# Patient Record
Sex: MALE | Race: WHITE | NOT HISPANIC OR LATINO | Employment: UNEMPLOYED | URBAN - NONMETROPOLITAN AREA
[De-identification: names, ages, dates, MRNs, and addresses within clinical notes are randomized per-mention and may not be internally consistent; named-entity substitution may affect disease eponyms.]

---

## 2021-02-25 DIAGNOSIS — Z23 IMMUNIZATION DUE: ICD-10-CM

## 2021-03-08 ENCOUNTER — IMMUNIZATION (OUTPATIENT)
Dept: VACCINE CLINIC | Facility: HOSPITAL | Age: 66
End: 2021-03-08

## 2021-03-08 PROCEDURE — 91300 HC SARSCOV02 VAC 30MCG/0.3ML IM: CPT | Performed by: INTERNAL MEDICINE

## 2021-03-08 PROCEDURE — 0001A: CPT | Performed by: INTERNAL MEDICINE

## 2021-03-29 ENCOUNTER — IMMUNIZATION (OUTPATIENT)
Dept: VACCINE CLINIC | Facility: HOSPITAL | Age: 66
End: 2021-03-29

## 2021-03-29 PROCEDURE — 91300 HC SARSCOV02 VAC 30MCG/0.3ML IM: CPT | Performed by: INTERNAL MEDICINE

## 2021-03-29 PROCEDURE — 0002A: CPT | Performed by: INTERNAL MEDICINE

## 2021-04-13 ENCOUNTER — TRANSCRIBE ORDERS (OUTPATIENT)
Dept: ADMINISTRATIVE | Facility: HOSPITAL | Age: 66
End: 2021-04-13

## 2021-04-13 DIAGNOSIS — Z01.818 PRE-OPERATIVE CLEARANCE: Primary | ICD-10-CM

## 2021-04-16 ENCOUNTER — LAB (OUTPATIENT)
Dept: LAB | Facility: HOSPITAL | Age: 66
End: 2021-04-16

## 2021-04-16 DIAGNOSIS — Z01.818 PRE-OPERATIVE CLEARANCE: ICD-10-CM

## 2021-04-16 PROCEDURE — C9803 HOPD COVID-19 SPEC COLLECT: HCPCS

## 2021-04-16 PROCEDURE — U0004 COV-19 TEST NON-CDC HGH THRU: HCPCS

## 2021-04-17 LAB — SARS-COV-2 RNA NOSE QL NAA+PROBE: NOT DETECTED

## 2021-10-08 ENCOUNTER — LAB (OUTPATIENT)
Dept: LAB | Facility: HOSPITAL | Age: 66
End: 2021-10-08

## 2021-10-08 ENCOUNTER — TRANSCRIBE ORDERS (OUTPATIENT)
Dept: ADMINISTRATIVE | Facility: HOSPITAL | Age: 66
End: 2021-10-08

## 2021-10-08 DIAGNOSIS — R23.3 SPONTANEOUS ECCHYMOSES: ICD-10-CM

## 2021-10-08 DIAGNOSIS — R23.3 SPONTANEOUS ECCHYMOSES: Primary | ICD-10-CM

## 2021-10-08 LAB
CLOSURE TME COLL+ADP BLD: 70 SECONDS (ref 56–102)
CLOSURE TME COLL+EPINEP BLD: 77 SECONDS (ref 80–184)
PLATELET FUNCTION: ABNORMAL

## 2021-10-08 PROCEDURE — 36415 COLL VENOUS BLD VENIPUNCTURE: CPT

## 2021-10-08 PROCEDURE — 85576 BLOOD PLATELET AGGREGATION: CPT

## 2021-10-15 ENCOUNTER — IMMUNIZATION (OUTPATIENT)
Dept: VACCINE CLINIC | Facility: HOSPITAL | Age: 66
End: 2021-10-15

## 2021-10-15 PROCEDURE — 91300 HC SARSCOV02 VAC 30MCG/0.3ML IM: CPT | Performed by: INTERNAL MEDICINE

## 2021-10-15 PROCEDURE — 0004A ADM SARSCOV2 30MCG/0.3ML BOOSTER: CPT | Performed by: INTERNAL MEDICINE

## 2023-06-13 ENCOUNTER — APPOINTMENT (OUTPATIENT)
Dept: GENERAL RADIOLOGY | Facility: HOSPITAL | Age: 68
End: 2023-06-13
Payer: MEDICARE

## 2023-06-13 PROCEDURE — 99282 EMERGENCY DEPT VISIT SF MDM: CPT

## 2023-06-13 PROCEDURE — 74018 RADEX ABDOMEN 1 VIEW: CPT | Performed by: RADIOLOGY

## 2023-06-13 PROCEDURE — 74018 RADEX ABDOMEN 1 VIEW: CPT

## 2023-06-14 ENCOUNTER — HOSPITAL ENCOUNTER (EMERGENCY)
Facility: HOSPITAL | Age: 68
Discharge: HOME | End: 2023-06-14
Attending: STUDENT IN AN ORGANIZED HEALTH CARE EDUCATION/TRAINING PROGRAM | Admitting: STUDENT IN AN ORGANIZED HEALTH CARE EDUCATION/TRAINING PROGRAM
Payer: MEDICARE

## 2023-06-14 ENCOUNTER — APPOINTMENT (OUTPATIENT)
Dept: CT IMAGING | Facility: HOSPITAL | Age: 68
End: 2023-06-14
Payer: MEDICARE

## 2023-06-14 ENCOUNTER — APPOINTMENT (OUTPATIENT)
Dept: GENERAL RADIOLOGY | Facility: HOSPITAL | Age: 68
End: 2023-06-14
Payer: MEDICARE

## 2023-06-14 VITALS
WEIGHT: 260 LBS | OXYGEN SATURATION: 97 % | BODY MASS INDEX: 37.22 KG/M2 | RESPIRATION RATE: 16 BRPM | HEART RATE: 66 BPM | DIASTOLIC BLOOD PRESSURE: 92 MMHG | TEMPERATURE: 97.8 F | SYSTOLIC BLOOD PRESSURE: 190 MMHG | HEIGHT: 70 IN

## 2023-06-14 DIAGNOSIS — T18.5XXA FOREIGN BODY IN ANUS AND RECTUM, INITIAL ENCOUNTER: Primary | ICD-10-CM

## 2023-06-14 PROCEDURE — 72220 X-RAY EXAM SACRUM TAILBONE: CPT | Performed by: RADIOLOGY

## 2023-06-14 PROCEDURE — 74176 CT ABD & PELVIS W/O CONTRAST: CPT

## 2023-06-14 PROCEDURE — 72220 X-RAY EXAM SACRUM TAILBONE: CPT

## 2023-06-14 PROCEDURE — 74176 CT ABD & PELVIS W/O CONTRAST: CPT | Performed by: RADIOLOGY

## 2023-06-14 RX ORDER — POLYETHYLENE GLYCOL 3350 17 G/17G
17 POWDER, FOR SOLUTION ORAL 2 TIMES DAILY
Qty: 238 G | Refills: 0 | Status: SHIPPED | OUTPATIENT
Start: 2023-06-14 | End: 2023-06-21

## 2023-06-14 NOTE — ED NOTES
MEDICAL SCREENING:    Reason for Visit: FB in rectum    Patient initially seen in triage.  The patient was advised further evaluation and diagnostic testing will be needed, some of the treatment and testing will be initiated in the lobby in order to begin the process.  The patient will be returned to the waiting area for the time being and possibly be re-assessed by a subsequent ED provider.  The patient will be brought back to the treatment area in as timely manner as possible.       Carson Azul II, PA  06/13/23 [id number]

## 2023-06-14 NOTE — ED PROVIDER NOTES
Subjective   History of Present Illness  67-year-old male presents to the ER with foreign body in the rectum.  Patient verbalized that he was using a plastic spoon with the diameter of that about a quarter.  And inserted again into his rectum.  Patient verbalized that a portion of the handle did break off into his rectum.  Patient states this piece was probably around 3 to 4 inches.  States that he tried to have a bowel movement at home to push it out however was unsuccessful.  Patient is not complaining of any pain at this time.      Review of Systems   Constitutional: Negative.  Negative for fever.   HENT: Negative.     Respiratory: Negative.     Cardiovascular: Negative.  Negative for chest pain.   Gastrointestinal: Negative.  Negative for abdominal pain.   Endocrine: Negative.    Genitourinary: Negative.  Negative for dysuria.   Skin: Negative.    Neurological: Negative.    Psychiatric/Behavioral: Negative.     All other systems reviewed and are negative.    No past medical history on file.    No Known Allergies    No past surgical history on file.    No family history on file.    Social History     Socioeconomic History    Marital status: Single           Objective   Physical Exam  Vitals and nursing note reviewed.   Constitutional:       General: He is not in acute distress.     Appearance: He is well-developed. He is not diaphoretic.   HENT:      Head: Normocephalic and atraumatic.      Right Ear: External ear normal.      Left Ear: External ear normal.      Nose: Nose normal.   Eyes:      Conjunctiva/sclera: Conjunctivae normal.   Neck:      Vascular: No JVD.      Trachea: No tracheal deviation.   Cardiovascular:      Rate and Rhythm: Normal rate.      Heart sounds: No murmur heard.  Pulmonary:      Effort: Pulmonary effort is normal. No respiratory distress.      Breath sounds: No wheezing.   Abdominal:      Palpations: Abdomen is soft.      Tenderness: There is no abdominal tenderness.   Genitourinary:      Comments: Digital rectal exam did not appreciate any palpation of foreign body within the rectum.  Musculoskeletal:         General: No deformity. Normal range of motion.      Cervical back: Normal range of motion and neck supple.   Skin:     General: Skin is warm and dry.      Coloration: Skin is not pale.      Findings: No erythema or rash.   Neurological:      Mental Status: He is alert and oriented to person, place, and time.      Cranial Nerves: No cranial nerve deficit.   Psychiatric:         Behavior: Behavior normal.         Thought Content: Thought content normal.       Procedures           ED Course  ED Course as of 06/14/23 0633   Wed Jun 14, 2023   0546 Contacted general surgery Dr. Freedman who states that given the size should pass.  Open to outpatient f/u if needed.   [RB]   0632 CT abd pelvis rad interpreted:  NEGATIVE FOR RADIOPAQUE FOREIGN BODY. [RB]   0633 XR sacrum and coccyx rad interpreted:  NO RADIOPAQUE FOREIGN BODY IDENTIFIED. [RB]   0633 XR KUB rad inerpreted: No radiopaque foreign body. Nonobstructive bowel gas pattern. [RB]      ED Course User Index  [RB] Carson Azul II, PA                                           Medical Decision Making  67-year-old male with chief complaint of placing something in his anus.    Problems Addressed:  Foreign body in anus and rectum, initial encounter: acute illness or injury     Details: Imaging does not show any type of foreign body.  Discussion with general surgery states that this should pass.  Outpatient surgical follow-up.  Return to the ED should symptoms worsen.    Amount and/or Complexity of Data Reviewed  Radiology: ordered. Decision-making details documented in ED Course.        Final diagnoses:   Foreign body in anus and rectum, initial encounter       ED Disposition  ED Disposition       ED Disposition   Discharge    Condition   Stable    Comment   --               Steve Freedman MD  [address] [place] [address]  [phone]  [phone]    Schedule an appointment as soon as possible for a visit            Medication List        New Prescriptions      polyethylene glycol 17 GM/SCOOP powder  Commonly known as: MIRALAX  Take 17 g by mouth 2 (Two) Times a Day for 7 days.               Where to Get Your Medications        These medications were sent to [facility] [place], [place] - [address] [place] [address] [phone] [facility] [phone] [address] [address]      Phone: [phone]   polyethylene glycol 17 GM/SCOOP powder            Carson Azul II, PA  06/14/23 0605

## 2023-06-14 NOTE — DISCHARGE INSTRUCTIONS
Continue with MiraLAX daily until bowels are continuously moving.  Monitor bowel movements for passage of foreign body.  Outpatient surgical follow-up with Dr. Freedman

## 2023-09-22 ENCOUNTER — TRANSCRIBE ORDERS (OUTPATIENT)
Dept: ADMINISTRATIVE | Facility: HOSPITAL | Age: 68
End: 2023-09-22
Payer: COMMERCIAL

## 2023-09-22 DIAGNOSIS — N28.1 ACQUIRED CYST OF KIDNEY: Primary | ICD-10-CM

## 2023-11-06 ENCOUNTER — HOSPITAL ENCOUNTER (OUTPATIENT)
Dept: ULTRASOUND IMAGING | Facility: HOSPITAL | Age: 68
Discharge: HOME | End: 2023-11-06
Admitting: HOSPITALIST
Payer: MEDICARE

## 2023-11-06 DIAGNOSIS — N28.1 ACQUIRED CYST OF KIDNEY: ICD-10-CM

## 2023-11-06 PROCEDURE — 76775 US EXAM ABDO BACK WALL LIM: CPT | Performed by: RADIOLOGY

## 2023-11-06 PROCEDURE — 76775 US EXAM ABDO BACK WALL LIM: CPT

## 2023-11-07 ENCOUNTER — APPOINTMENT (OUTPATIENT)
Dept: CT IMAGING | Facility: HOSPITAL | Age: 68
DRG: 683 | End: 2023-11-07
Payer: MEDICARE

## 2023-11-07 ENCOUNTER — HOSPITAL ENCOUNTER (INPATIENT)
Facility: HOSPITAL | Age: 68
LOS: 1 days | Discharge: HOME | DRG: 683 | End: 2023-11-10
Attending: STUDENT IN AN ORGANIZED HEALTH CARE EDUCATION/TRAINING PROGRAM | Admitting: INTERNAL MEDICINE
Payer: MEDICARE

## 2023-11-07 DIAGNOSIS — E87.5 HYPERKALEMIA: ICD-10-CM

## 2023-11-07 DIAGNOSIS — N17.9 AKI (ACUTE KIDNEY INJURY): Primary | ICD-10-CM

## 2023-11-07 LAB
ALBUMIN SERPL-MCNC: 4.2 G/DL (ref 3.5–5.2)
ALBUMIN/GLOB SERPL: 1.5 G/DL
ALP SERPL-CCNC: 87 U/L (ref 39–117)
ALT SERPL W P-5'-P-CCNC: 11 U/L (ref 1–41)
ANION GAP SERPL CALCULATED.3IONS-SCNC: 8.9 MMOL/L (ref 5–15)
ANION GAP SERPL CALCULATED.3IONS-SCNC: 9.6 MMOL/L (ref 5–15)
AST SERPL-CCNC: 15 U/L (ref 1–40)
BASOPHILS # BLD AUTO: 0.06 10*3/MM3 (ref 0–0.2)
BASOPHILS NFR BLD AUTO: 0.7 % (ref 0–1.5)
BILIRUB SERPL-MCNC: 0.4 MG/DL (ref 0–1.2)
BILIRUB UR QL STRIP: NEGATIVE
BUN SERPL-MCNC: 57 MG/DL (ref 8–23)
BUN SERPL-MCNC: 61 MG/DL (ref 8–23)
BUN/CREAT SERPL: 20.1 (ref 7–25)
BUN/CREAT SERPL: 21.2 (ref 7–25)
CALCIUM SPEC-SCNC: 8.5 MG/DL (ref 8.6–10.5)
CALCIUM SPEC-SCNC: 9.2 MG/DL (ref 8.6–10.5)
CHLORIDE SERPL-SCNC: 110 MMOL/L (ref 98–107)
CHLORIDE SERPL-SCNC: 115 MMOL/L (ref 98–107)
CK SERPL-CCNC: 83 U/L (ref 20–200)
CLARITY UR: CLEAR
CO2 SERPL-SCNC: 16.1 MMOL/L (ref 22–29)
CO2 SERPL-SCNC: 20.4 MMOL/L (ref 22–29)
COLOR UR: YELLOW
CREAT SERPL-MCNC: 2.69 MG/DL (ref 0.76–1.27)
CREAT SERPL-MCNC: 3.03 MG/DL (ref 0.76–1.27)
DEPRECATED RDW RBC AUTO: 47 FL (ref 37–54)
EGFRCR SERPLBLD CKD-EPI 2021: 21.9 ML/MIN/1.73
EGFRCR SERPLBLD CKD-EPI 2021: 25.3 ML/MIN/1.73
EOSINOPHIL # BLD AUTO: 0.27 10*3/MM3 (ref 0–0.4)
EOSINOPHIL NFR BLD AUTO: 3.2 % (ref 0.3–6.2)
ERYTHROCYTE [DISTWIDTH] IN BLOOD BY AUTOMATED COUNT: 12.6 % (ref 12.3–15.4)
GLOBULIN UR ELPH-MCNC: 2.8 GM/DL
GLUCOSE BLDC GLUCOMTR-MCNC: 127 MG/DL (ref 70–130)
GLUCOSE BLDC GLUCOMTR-MCNC: 137 MG/DL (ref 70–130)
GLUCOSE SERPL-MCNC: 82 MG/DL (ref 65–99)
GLUCOSE SERPL-MCNC: 86 MG/DL (ref 65–99)
GLUCOSE UR STRIP-MCNC: ABNORMAL MG/DL
HBA1C MFR BLD: 7 % (ref 4.8–5.6)
HCT VFR BLD AUTO: 39 % (ref 37.5–51)
HGB BLD-MCNC: 11.8 G/DL (ref 13–17.7)
HGB UR QL STRIP.AUTO: NEGATIVE
HOLD SPECIMEN: NORMAL
HOLD SPECIMEN: NORMAL
IMM GRANULOCYTES # BLD AUTO: 0.04 10*3/MM3 (ref 0–0.05)
IMM GRANULOCYTES NFR BLD AUTO: 0.5 % (ref 0–0.5)
KETONES UR QL STRIP: NEGATIVE
LEUKOCYTE ESTERASE UR QL STRIP.AUTO: NEGATIVE
LYMPHOCYTES # BLD AUTO: 2.29 10*3/MM3 (ref 0.7–3.1)
LYMPHOCYTES NFR BLD AUTO: 27.3 % (ref 19.6–45.3)
MAGNESIUM SERPL-MCNC: 1.3 MG/DL (ref 1.6–2.4)
MCH RBC QN AUTO: 30.6 PG (ref 26.6–33)
MCHC RBC AUTO-ENTMCNC: 30.3 G/DL (ref 31.5–35.7)
MCV RBC AUTO: 101 FL (ref 79–97)
MONOCYTES # BLD AUTO: 0.59 10*3/MM3 (ref 0.1–0.9)
MONOCYTES NFR BLD AUTO: 7 % (ref 5–12)
NEUTROPHILS NFR BLD AUTO: 5.15 10*3/MM3 (ref 1.7–7)
NEUTROPHILS NFR BLD AUTO: 61.3 % (ref 42.7–76)
NITRITE UR QL STRIP: NEGATIVE
NRBC BLD AUTO-RTO: 0 /100 WBC (ref 0–0.2)
PH UR STRIP.AUTO: <=5 [PH] (ref 5–8)
PLATELET # BLD AUTO: 244 10*3/MM3 (ref 140–450)
PMV BLD AUTO: 9.9 FL (ref 6–12)
POTASSIUM SERPL-SCNC: 5.8 MMOL/L (ref 3.5–5.2)
POTASSIUM SERPL-SCNC: 5.9 MMOL/L (ref 3.5–5.2)
PROT SERPL-MCNC: 7 G/DL (ref 6–8.5)
PROT UR QL STRIP: NEGATIVE
RBC # BLD AUTO: 3.86 10*6/MM3 (ref 4.14–5.8)
SODIUM SERPL-SCNC: 140 MMOL/L (ref 136–145)
SODIUM SERPL-SCNC: 140 MMOL/L (ref 136–145)
SP GR UR STRIP: 1.01 (ref 1–1.03)
UROBILINOGEN UR QL STRIP: ABNORMAL
WBC NRBC COR # BLD: 8.4 10*3/MM3 (ref 3.4–10.8)
WHOLE BLOOD HOLD COAG: NORMAL
WHOLE BLOOD HOLD SPECIMEN: NORMAL

## 2023-11-07 PROCEDURE — 63710000001 INSULIN GLARGINE PER 5 UNITS: Performed by: INTERNAL MEDICINE

## 2023-11-07 PROCEDURE — 36415 COLL VENOUS BLD VENIPUNCTURE: CPT

## 2023-11-07 PROCEDURE — 99223 1ST HOSP IP/OBS HIGH 75: CPT

## 2023-11-07 PROCEDURE — 93010 ELECTROCARDIOGRAM REPORT: CPT | Performed by: INTERNAL MEDICINE

## 2023-11-07 PROCEDURE — 25810000003 SODIUM CHLORIDE 0.9 % SOLUTION: Performed by: PHYSICIAN ASSISTANT

## 2023-11-07 PROCEDURE — 80053 COMPREHEN METABOLIC PANEL: CPT | Performed by: PHYSICIAN ASSISTANT

## 2023-11-07 PROCEDURE — 83735 ASSAY OF MAGNESIUM: CPT | Performed by: PHYSICIAN ASSISTANT

## 2023-11-07 PROCEDURE — G0378 HOSPITAL OBSERVATION PER HR: HCPCS

## 2023-11-07 PROCEDURE — 93005 ELECTROCARDIOGRAM TRACING: CPT | Performed by: PHYSICIAN ASSISTANT

## 2023-11-07 PROCEDURE — 25010000002 MAGNESIUM SULFATE 2 GM/50ML SOLUTION: Performed by: PHYSICIAN ASSISTANT

## 2023-11-07 PROCEDURE — 25010000002 HEPARIN (PORCINE) PER 1000 UNITS: Performed by: INTERNAL MEDICINE

## 2023-11-07 PROCEDURE — 82550 ASSAY OF CK (CPK): CPT | Performed by: PHYSICIAN ASSISTANT

## 2023-11-07 PROCEDURE — 74176 CT ABD & PELVIS W/O CONTRAST: CPT

## 2023-11-07 PROCEDURE — 74176 CT ABD & PELVIS W/O CONTRAST: CPT | Performed by: RADIOLOGY

## 2023-11-07 PROCEDURE — 83036 HEMOGLOBIN GLYCOSYLATED A1C: CPT

## 2023-11-07 PROCEDURE — 99285 EMERGENCY DEPT VISIT HI MDM: CPT

## 2023-11-07 PROCEDURE — 82948 REAGENT STRIP/BLOOD GLUCOSE: CPT

## 2023-11-07 PROCEDURE — 81003 URINALYSIS AUTO W/O SCOPE: CPT | Performed by: PHYSICIAN ASSISTANT

## 2023-11-07 PROCEDURE — 85025 COMPLETE CBC W/AUTO DIFF WBC: CPT | Performed by: PHYSICIAN ASSISTANT

## 2023-11-07 RX ORDER — LISINOPRIL 20 MG/1
20 TABLET ORAL 2 TIMES DAILY
COMMUNITY
End: 2023-11-10 | Stop reason: HOSPADM

## 2023-11-07 RX ORDER — DAPAGLIFLOZIN 10 MG/1
10 TABLET, FILM COATED ORAL DAILY
COMMUNITY
End: 2023-11-10 | Stop reason: HOSPADM

## 2023-11-07 RX ORDER — GLUCAGON 1 MG/ML
1 KIT INJECTION
Status: DISCONTINUED | OUTPATIENT
Start: 2023-11-07 | End: 2023-11-10 | Stop reason: HOSPADM

## 2023-11-07 RX ORDER — SODIUM CHLORIDE 0.9 % (FLUSH) 0.9 %
10 SYRINGE (ML) INJECTION AS NEEDED
Status: DISCONTINUED | OUTPATIENT
Start: 2023-11-07 | End: 2023-11-10 | Stop reason: HOSPADM

## 2023-11-07 RX ORDER — ATORVASTATIN CALCIUM 10 MG/1
10 TABLET, FILM COATED ORAL DAILY
Status: DISCONTINUED | OUTPATIENT
Start: 2023-11-07 | End: 2023-11-10 | Stop reason: HOSPADM

## 2023-11-07 RX ORDER — BISACODYL 10 MG
10 SUPPOSITORY, RECTAL RECTAL DAILY PRN
Status: DISCONTINUED | OUTPATIENT
Start: 2023-11-07 | End: 2023-11-10 | Stop reason: HOSPADM

## 2023-11-07 RX ORDER — TAMSULOSIN HYDROCHLORIDE 0.4 MG/1
1 CAPSULE ORAL DAILY
COMMUNITY

## 2023-11-07 RX ORDER — GLIPIZIDE 5 MG/1
20 TABLET ORAL
Status: CANCELLED | OUTPATIENT
Start: 2023-11-08

## 2023-11-07 RX ORDER — INSULIN DEGLUDEC INJECTION 100 U/ML
10 INJECTION, SOLUTION SUBCUTANEOUS NIGHTLY
COMMUNITY

## 2023-11-07 RX ORDER — LACTULOSE 10 G/15ML
30 SOLUTION ORAL ONCE
Status: COMPLETED | OUTPATIENT
Start: 2023-11-07 | End: 2023-11-07

## 2023-11-07 RX ORDER — INSULIN LISPRO 100 [IU]/ML
2-7 INJECTION, SOLUTION INTRAVENOUS; SUBCUTANEOUS
Status: DISCONTINUED | OUTPATIENT
Start: 2023-11-07 | End: 2023-11-10 | Stop reason: HOSPADM

## 2023-11-07 RX ORDER — NIFEDIPINE 30 MG/1
30 TABLET, FILM COATED, EXTENDED RELEASE ORAL ONCE
Status: COMPLETED | OUTPATIENT
Start: 2023-11-07 | End: 2023-11-07

## 2023-11-07 RX ORDER — SODIUM CHLORIDE 9 MG/ML
40 INJECTION, SOLUTION INTRAVENOUS AS NEEDED
Status: DISCONTINUED | OUTPATIENT
Start: 2023-11-07 | End: 2023-11-10 | Stop reason: HOSPADM

## 2023-11-07 RX ORDER — POLYETHYLENE GLYCOL 3350 17 G/17G
17 POWDER, FOR SOLUTION ORAL DAILY PRN
Status: DISCONTINUED | OUTPATIENT
Start: 2023-11-07 | End: 2023-11-10 | Stop reason: HOSPADM

## 2023-11-07 RX ORDER — TAMSULOSIN HYDROCHLORIDE 0.4 MG/1
0.4 CAPSULE ORAL DAILY
Status: DISCONTINUED | OUTPATIENT
Start: 2023-11-07 | End: 2023-11-10 | Stop reason: HOSPADM

## 2023-11-07 RX ORDER — PANTOPRAZOLE SODIUM 40 MG/1
40 TABLET, DELAYED RELEASE ORAL
Status: DISCONTINUED | OUTPATIENT
Start: 2023-11-08 | End: 2023-11-10 | Stop reason: HOSPADM

## 2023-11-07 RX ORDER — CLONIDINE 0.3 MG/24H
1 PATCH, EXTENDED RELEASE TRANSDERMAL WEEKLY
COMMUNITY

## 2023-11-07 RX ORDER — GLIMEPIRIDE 4 MG/1
8 TABLET ORAL
COMMUNITY

## 2023-11-07 RX ORDER — CALCITRIOL 0.25 UG/1
0.25 CAPSULE, LIQUID FILLED ORAL 3 TIMES WEEKLY
Status: DISCONTINUED | OUTPATIENT
Start: 2023-11-08 | End: 2023-11-10 | Stop reason: HOSPADM

## 2023-11-07 RX ORDER — LISINOPRIL 10 MG/1
20 TABLET ORAL 2 TIMES DAILY
Status: CANCELLED | OUTPATIENT
Start: 2023-11-07

## 2023-11-07 RX ORDER — NICOTINE POLACRILEX 4 MG
15 LOZENGE BUCCAL
Status: DISCONTINUED | OUTPATIENT
Start: 2023-11-07 | End: 2023-11-10 | Stop reason: HOSPADM

## 2023-11-07 RX ORDER — DEXTROSE MONOHYDRATE 25 G/50ML
25 INJECTION, SOLUTION INTRAVENOUS
Status: DISCONTINUED | OUTPATIENT
Start: 2023-11-07 | End: 2023-11-10 | Stop reason: HOSPADM

## 2023-11-07 RX ORDER — CLONIDINE 0.3 MG/24H
1 PATCH, EXTENDED RELEASE TRANSDERMAL WEEKLY
Status: DISCONTINUED | OUTPATIENT
Start: 2023-11-12 | End: 2023-11-10 | Stop reason: HOSPADM

## 2023-11-07 RX ORDER — NIFEDIPINE 30 MG/1
30 TABLET, FILM COATED, EXTENDED RELEASE ORAL
Status: DISCONTINUED | OUTPATIENT
Start: 2023-11-07 | End: 2023-11-07

## 2023-11-07 RX ORDER — OMEPRAZOLE 20 MG/1
20 CAPSULE, DELAYED RELEASE ORAL DAILY
COMMUNITY

## 2023-11-07 RX ORDER — MAGNESIUM SULFATE HEPTAHYDRATE 40 MG/ML
2 INJECTION, SOLUTION INTRAVENOUS ONCE
Status: COMPLETED | OUTPATIENT
Start: 2023-11-07 | End: 2023-11-07

## 2023-11-07 RX ORDER — AMOXICILLIN 250 MG
2 CAPSULE ORAL 2 TIMES DAILY
Status: DISCONTINUED | OUTPATIENT
Start: 2023-11-07 | End: 2023-11-10 | Stop reason: HOSPADM

## 2023-11-07 RX ORDER — ATORVASTATIN CALCIUM 10 MG/1
10 TABLET, FILM COATED ORAL DAILY
COMMUNITY

## 2023-11-07 RX ORDER — CALCITRIOL 0.25 UG/1
0.25 CAPSULE, LIQUID FILLED ORAL 3 TIMES WEEKLY
COMMUNITY

## 2023-11-07 RX ORDER — GABAPENTIN 400 MG/1
400 CAPSULE ORAL 3 TIMES DAILY PRN
Status: CANCELLED | OUTPATIENT
Start: 2023-11-07

## 2023-11-07 RX ORDER — HYDRALAZINE HYDROCHLORIDE 25 MG/1
25 TABLET, FILM COATED ORAL ONCE
Status: COMPLETED | OUTPATIENT
Start: 2023-11-07 | End: 2023-11-07

## 2023-11-07 RX ORDER — GABAPENTIN 400 MG/1
400 CAPSULE ORAL 3 TIMES DAILY PRN
COMMUNITY
End: 2023-11-10 | Stop reason: HOSPADM

## 2023-11-07 RX ORDER — SODIUM CHLORIDE 0.9 % (FLUSH) 0.9 %
10 SYRINGE (ML) INJECTION EVERY 12 HOURS SCHEDULED
Status: DISCONTINUED | OUTPATIENT
Start: 2023-11-07 | End: 2023-11-10 | Stop reason: HOSPADM

## 2023-11-07 RX ORDER — BISACODYL 5 MG/1
5 TABLET, DELAYED RELEASE ORAL DAILY PRN
Status: DISCONTINUED | OUTPATIENT
Start: 2023-11-07 | End: 2023-11-10 | Stop reason: HOSPADM

## 2023-11-07 RX ORDER — HEPARIN SODIUM 5000 [USP'U]/ML
5000 INJECTION, SOLUTION INTRAVENOUS; SUBCUTANEOUS EVERY 8 HOURS SCHEDULED
Status: DISCONTINUED | OUTPATIENT
Start: 2023-11-07 | End: 2023-11-10 | Stop reason: HOSPADM

## 2023-11-07 RX ADMIN — SODIUM ZIRCONIUM CYCLOSILICATE 10 G: 10 POWDER, FOR SUSPENSION ORAL at 08:06

## 2023-11-07 RX ADMIN — SODIUM CHLORIDE 1000 ML: 9 INJECTION, SOLUTION INTRAVENOUS at 09:22

## 2023-11-07 RX ADMIN — Medication 10 ML: at 20:48

## 2023-11-07 RX ADMIN — SODIUM BICARBONATE 75 MEQ: 84 INJECTION, SOLUTION INTRAVENOUS at 13:44

## 2023-11-07 RX ADMIN — INSULIN GLARGINE 10 UNITS: 100 INJECTION, SOLUTION SUBCUTANEOUS at 20:48

## 2023-11-07 RX ADMIN — NIFEDIPINE 30 MG: 30 TABLET, EXTENDED RELEASE ORAL at 09:50

## 2023-11-07 RX ADMIN — HEPARIN SODIUM 5000 UNITS: 5000 INJECTION INTRAVENOUS; SUBCUTANEOUS at 15:32

## 2023-11-07 RX ADMIN — NIFEDIPINE 30 MG: 30 TABLET, EXTENDED RELEASE ORAL at 11:43

## 2023-11-07 RX ADMIN — SODIUM BICARBONATE 75 MEQ: 84 INJECTION, SOLUTION INTRAVENOUS at 21:50

## 2023-11-07 RX ADMIN — SODIUM CHLORIDE 1000 ML: 9 INJECTION, SOLUTION INTRAVENOUS at 09:10

## 2023-11-07 RX ADMIN — ATORVASTATIN CALCIUM 10 MG: 10 TABLET, FILM COATED ORAL at 18:10

## 2023-11-07 RX ADMIN — TAMSULOSIN HYDROCHLORIDE 0.4 MG: 0.4 CAPSULE ORAL at 18:10

## 2023-11-07 RX ADMIN — HEPARIN SODIUM 5000 UNITS: 5000 INJECTION INTRAVENOUS; SUBCUTANEOUS at 21:06

## 2023-11-07 RX ADMIN — LACTULOSE 30 G: 20 SOLUTION ORAL at 13:36

## 2023-11-07 RX ADMIN — SODIUM ZIRCONIUM CYCLOSILICATE 10 G: 10 POWDER, FOR SUSPENSION ORAL at 13:36

## 2023-11-07 RX ADMIN — MAGNESIUM SULFATE HEPTAHYDRATE 2 G: 40 INJECTION, SOLUTION INTRAVENOUS at 08:06

## 2023-11-07 RX ADMIN — HYDRALAZINE HYDROCHLORIDE 25 MG: 25 TABLET, FILM COATED ORAL at 09:49

## 2023-11-07 NOTE — H&P
[facility] Medicine Services  History & Physical    Patient Identification:  Name:  Moe Smith  Age:  68 y.o.  Sex:  male  :  1955  MRN:  [id number]   Visit Number:  [id number]  Admit Date: 2023   Primary Care Physician:  Mendoza Sharma MD    Subjective     Chief complaint: Abnormal lab    History of presenting illness:      Moe Smith is a 68 y.o. male with past medical history significant for CKD stage IIIa, insulin-dependent type 2 diabetes mellitus, essential hypertension, hyperlipidemia, and obesity by BMI.  Patient presents to [facility] emergency department after being directed to do so due to elevated potassium level. Patient states that he follows with Nephrology in the outpatient setting and had labs drawn per order by Dr. Muller on 2023. He states that he received notification of critical lab via telephone this morning around 05:30 a.m. Patient states that he has had issues with hyperkalemia in the past. Previously on losartan due to ACE-I intolerance however his potassium became elevated therefore he was placed back on Lisinopril. States that he has been taking Lisinopril BID; last dose was yesterday evening. He denies any lower extremity edema, palpitations, chest pain, shortness of breath, or any other acute complaints at this time. States that he has been eating and drinking normally; blood glucose levels have been running low and he admits that he has been eating peanut butter and crackers frequently to increase his glucose levels. He also states that he bought a gallon of orange juice yesterday. He has only been taking 10 units of his night time insulin instead of previously prescribed 20 units long acting insulin. Plan to hold Lisinopril on admission. Patient was placed on balanced bicarb gtt. Nephrology consulted for assistance as patient's renal function has worsened as well and he meets criteria for JANNETH. Creatinine on arrival 3.0;  baseline appears to be ~1.2-1.5. Discussed plans with patient. He voiced agreement and understanding with no further questions or concerns at this time.     Upon arrival to the ED, vital signs were temperature 97, pulse 67, respirations 12, blood pressure 187/93, SPO2 saturation 100% on room air.  CMP with potassium 5.8, chloride 110, CO2 20.4, BUN 61, creatinine 3.03, EGFR 21.9, magnesium 1.3, otherwise unremarkable.  CBC with RBCs 3.86, hemoglobin 11.8, .0, MCHC 30.3, otherwise unremarkable.  Urinalysis with 2+ glucose, otherwise unremarkable.  CT of the abdomen and pelvis without contrast noted degenerative changes in the lumbar spine, otherwise no acute abnormality.  Renal ultrasound noted 3.7 cm simple cyst of the right kidney.  Otherwise bilateral renal ultrasound negative.    Known Emergency Department medications received prior to my evaluation included 25 mg oral hydralazine, 30 g lactulose, 2 g IV magnesium sulfate, 30 mg nifedipine, 1 L normal saline bolus x2, 10 g packet of Lokelma x2, sodium bicarb infusion. Room location at the time of my evaluation was 304B.  Discussed with admitting physician, Toy Krause MD.    ---------------------------------------------------------------------------------------------------------------------   Review of Systems   Constitutional:  Negative for chills, fatigue and fever.   HENT:  Negative for congestion, sore throat and trouble swallowing.    Respiratory:  Negative for cough, shortness of breath and wheezing.    Cardiovascular:  Negative for chest pain, palpitations and leg swelling.   Gastrointestinal:  Negative for abdominal pain, constipation, diarrhea, nausea and vomiting.   Genitourinary:  Negative for difficulty urinating, flank pain, frequency and urgency.   Musculoskeletal:  Negative for back pain, gait problem, neck pain and neck stiffness.   Neurological:  Negative for dizziness, tremors, seizures, syncope, facial asymmetry, speech  difficulty, weakness, light-headedness, numbness and headaches.     ---------------------------------------------------------------------------------------------------------------------   No past medical history on file.  No past surgical history on file.  No family history on file.  Social History     Socioeconomic History    Marital status: Single     ---------------------------------------------------------------------------------------------------------------------   Allergies:  Sulfa antibiotics  ---------------------------------------------------------------------------------------------------------------------   Home medications:    Medications below are reported home medications pulling from within the system; at this time, these medications have not been reconciled unless otherwise specified and are in the verification process for further verifcation as current home medications.  (Not in a hospital admission)      Hospital Scheduled Meds:     sodium bicarbonate 8.4 % 75 mEq in sodium chloride 0.45 % 1,000 mL infusion (greater than 75 mEq), 75 mEq, Last Rate: 75 mEq (11/07/23 7524)        Current listed hospital scheduled medications may not yet reflect those currently placed in orders that are signed and held awaiting patient's arrival to floor.   ---------------------------------------------------------------------------------------------------------------------     Objective     Vital Signs:  Temp:  [97 °F (36.1 °C)] 97 °F (36.1 °C)  Heart Rate:  [61-85] 85  Resp:  [12] 12  BP: (147-192)/() 162/77      11/07/23  0642   Weight: 124 kg (273 lb)     Body mass index is 38.08 kg/m².  ---------------------------------------------------------------------------------------------------------------------       Physical Exam  Vitals reviewed.   Constitutional:       General: He is awake. He is not in acute distress.     Appearance: He is obese. He is not ill-appearing or diaphoretic.      Comments: Room air.     HENT:      Head: Normocephalic and atraumatic.      Mouth/Throat:      Mouth: Mucous membranes are moist.      Pharynx: Oropharynx is clear.   Eyes:      Extraocular Movements: Extraocular movements intact.      Pupils: Pupils are equal, round, and reactive to light.   Cardiovascular:      Rate and Rhythm: Normal rate and regular rhythm.      Pulses: Normal pulses.           Dorsalis pedis pulses are 2+ on the right side and 2+ on the left side.      Heart sounds: Normal heart sounds. No murmur heard.     No friction rub.   Pulmonary:      Effort: Pulmonary effort is normal. No accessory muscle usage, respiratory distress or retractions.      Breath sounds: No wheezing, rhonchi or rales.   Abdominal:      General: Bowel sounds are normal. There is no distension.      Palpations: Abdomen is soft.      Tenderness: There is no abdominal tenderness. There is no guarding.   Musculoskeletal:      Cervical back: Neck supple. No rigidity.      Right lower leg: No edema.      Left lower leg: No edema.   Skin:     General: Skin is warm and dry.      Capillary Refill: Capillary refill takes 2 to 3 seconds.   Neurological:      Mental Status: He is alert and oriented to person, place, and time. Mental status is at baseline.      Cranial Nerves: No dysarthria or facial asymmetry.      Sensory: Sensation is intact.      Motor: No weakness or tremor.   Psychiatric:         Attention and Perception: Attention normal.         Mood and Affect: Mood normal.         Speech: Speech normal.         Behavior: Behavior normal. Behavior is cooperative.         Thought Content: Thought content normal.         Cognition and Memory: Cognition normal.         Judgment: Judgment normal.       ---------------------------------------------------------------------------------------------------------------------  EKG:  Pending formal cardiology interpretation. Per my review, appears normal sinus rhythm 70s without evidence of acute ischemia.  "Mildly peaked T waves.      Telemetry:  Appearing normal sinus rhythm 70s during my exam.   I have personally looked at both the EKG and the telemetry strips.  ---------------------------------------------------------------------------------------------------------------------   Results from last 7 days   Lab Units 11/07/23  0701   WBC 10*3/mm3 8.40   HEMOGLOBIN g/dL 11.8*   HEMATOCRIT % 39.0   MCV fL 101.0*   MCHC g/dL 30.3*   PLATELETS 10*3/mm3 244         Results from last 7 days   Lab Units 11/07/23  1203 11/07/23  0701   SODIUM mmol/L 140 140   POTASSIUM mmol/L 5.9* 5.8*   MAGNESIUM mg/dL  --  1.3*   CHLORIDE mmol/L 115* 110*   CO2 mmol/L 16.1* 20.4*   BUN mg/dL 57* 61*   CREATININE mg/dL 2.69* 3.03*   CALCIUM mg/dL 8.5* 9.2   GLUCOSE mg/dL 86 82   ALBUMIN g/dL  --  4.2   BILIRUBIN mg/dL  --  0.4   ALK PHOS U/L  --  87   AST (SGOT) U/L  --  15   ALT (SGPT) U/L  --  11   Estimated Creatinine Clearance: 36.2 mL/min (A) (by C-G formula based on SCr of 2.69 mg/dL (H)).  No results found for: \"AMMONIA\"  Results from last 7 days   Lab Units 11/07/23  0701   CK TOTAL U/L 83         Lab Results   Component Value Date    HGBA1C 6.7 (H) 07/11/2015     No results found for: \"TSH\", \"FREET4\"  No results found for: \"PREGTESTUR\", \"PREGSERUM\", \"HCG\", \"HCGQUANT\"  Pain Management Panel  More data exists         Latest Ref Rng & Units 9/3/2016 8/30/2015   Pain Management Panel   Creatinine, Urine mg/dL 135.8  104.2          ---------------------------------------------------------------------------------------------------------------------  Imaging Results (Last 7 Days)       Procedure Component Value Units Date/Time    CT Abdomen Pelvis Without Contrast [[id number]] Collected: 11/07/23 0820     Updated: 11/07/23 0822    Narrative:      EXAM:    CT Abdomen and Pelvis Without Intravenous Contrast     EXAM DATE:    11/7/2023 8:51 AM     CLINICAL HISTORY:    JANNETH     TECHNIQUE:    Axial computed tomography images of the abdomen and " pelvis without  intravenous contrast.  Sagittal and coronal reformatted images were  created and reviewed.  This CT exam was performed using one or more of  the following dose reduction techniques:  automated exposure control,  adjustment of the mA and/or kV according to patient size, and/or use of  iterative reconstruction technique.     COMPARISON:    6/14/2023     FINDINGS:    LUNG BASES:  Unremarkable as visualized.  No mass.  No consolidation.      ABDOMEN:    LIVER:  Unremarkable as visualized.    GALLBLADDER AND BILE DUCTS:  Unremarkable as visualized.  No calcified  stones.  No ductal dilation.    PANCREAS:  Unremarkable as visualized.  No ductal dilation.    SPLEEN:  Unremarkable as visualized.  No splenomegaly.    ADRENALS:  Unremarkable as visualized.  No mass.    KIDNEYS AND URETERS:  Unremarkable as visualized.  No obstructing  stones.  No hydronephrosis.    STOMACH AND BOWEL:  Scattered diverticula in the colon.  No  diverticulitis.  No obstruction.      PELVIS:    APPENDIX:  No findings to suggest acute appendicitis.    BLADDER:  Unremarkable as visualized.  No stones.    REPRODUCTIVE:  Unremarkable as visualized.      ABDOMEN and PELVIS:    INTRAPERITONEAL SPACE:  Unremarkable as visualized.  No free air.  No  significant fluid collection.    BONES/JOINTS:  Degenerative disc disease throughout the lumbar spine.   Degenerative facet arthropathy throughout the lumbar spine, most  prominent in the lower lumbar spine.  No acute fracture.  No  dislocation.    SOFT TISSUES:  Unremarkable as visualized.    VASCULATURE:  Atherosclerotic disease.  No abdominal aortic aneurysm.    LYMPH NODES:  Unremarkable as visualized.  No enlarged lymph nodes.       Impression:        Degenerative changes lumbar spine as described.        This report was finalized on 11/7/2023 8:20 AM by Dr. Raul Buchanan MD.               Last echocardiogram: No previous echo on file.      I have personally reviewed the above radiology  images and read the final radiology report on 11/07/23  ---------------------------------------------------------------------------------------------------------------------  Assessment / Plan     Active Hospital Problems    Diagnosis  POA    **JANNETH (acute kidney injury) [N17.9]  Yes       ASSESSMENT/PLAN:  -JANNETH on CKD stage IIIa, present on admission  -Anion gap metabolic acidosis due to above  -Acute hypomagnesemia and acute hyperkalemia, present on admission  -Chronic macrocytic anemia, stable  -Hypertension, uncontrolled  -Insulin-dependent type II DM  -Obesity by BMI, 39.89 kg/m2  -Baseline creatinine ~1.2-1.5 with creatinine on admission of 3.0.  -CT of the abdomen and pelvis without contrast obtained without evidence of urinary obstruction or hydronephrosis.  -Bilateral renal ultrasound noted 3.7 cm cyst of the right kidney, otherwise unremarkable.  -Monitor urine output and renal function closely.  -Avoid nephrotoxins as able.  -Avoid hypotension.  -Patient hypertensive on arrival to ED; BP improved after administration of oral hydralazine and Nifedipine.   -Continue to closely monitor VS per hospital policy.  -Review home antihypertensive agents once reconciled by pharmacy; plan to continue with holding parameters to prevent hypotension and/or bradycardia. Hold any medication which may have contributed to JANNETH.   -Placed on balanced bicarb gtt on admission @ 150 ml/hr.  -Received Lokelma x2 and magnesium replacement so far.   -Continue to monitor electrolytes and replace as necessary.   -Continuous cardiac monitoring ordered.  -Repeat chemistry panel in AM.  -Inpatient Nephrology consult placed for further assistance. Greatly appreciate their input.   -Obtain HgbA1C.  -Review home medication regimen once reconciled per pharmacy.   -Hold any home oral DM medications for now.  -Start low dose SSI, titrate dosage as necessary.  -Closely monitor blood glucose levels with accuchecks AC and HS.  -Hypoglycemia  protocol in place.       ----------  -DVT prophylaxis: SubQ Heparin.  -Activity: As tolerated.   -Expected length of stay:   OBSERVATION status; however, if further evaluation or treatment plans warrant, status may change.  Based upon current information, I predict patient's care encounter to be less than or equal to 2 midnights   -Disposition plans to return home on discharge once medically stable.    High risk secondary to JANNETH on CKD stage IIIa, anion gap metabolic acidosis, acute hypomagnesemia, acute hyperkalemia.       CODE STATUS: FULL CODE      Jelly Dejesus, APRN 11/07/23  13:51 EST  Pager #[phone]  ---------------------------------------------------------------------------------------------------------------------

## 2023-11-07 NOTE — PLAN OF CARE
Goal Outcome Evaluation:  Plan of Care Reviewed With: patient        Progress: improving          Patient admitted from ER this shift. Patient A&Ox4 denies any skin issues and refuses a full skin assessment with ELVIA [place] present. Patient has no complaints at this time. No s/s of acute distress noted. Will continue to follow plan of care.

## 2023-11-07 NOTE — ED PROVIDER NOTES
Subjective   History of Present Illness  68-year-old male presents to the ED with chief complaint of abnormal lab.  Patient verbalized at outpatient lab he had a metabolic panel ran yesterday.  Patient was notified that his potassium was 6.9.  Patient does have stage III kidney disease in which she is under the care of nephrologist Dr. Montero.  Patient states the only medication that he has started over the last few weeks has been Flomax.  Patient did stop his angiotensin receptor blocker.        Review of Systems   Constitutional: Negative.  Negative for fever.   HENT: Negative.     Respiratory: Negative.     Cardiovascular: Negative.  Negative for chest pain.   Gastrointestinal: Negative.  Negative for abdominal pain.   Endocrine: Negative.    Genitourinary: Negative.  Negative for dysuria.   Skin: Negative.    Neurological: Negative.    Psychiatric/Behavioral: Negative.     All other systems reviewed and are negative.      No past medical history on file.    Allergies   Allergen Reactions    Sulfa Antibiotics Hives       No past surgical history on file.    No family history on file.    Social History     Socioeconomic History    Marital status: Single           Objective   Physical Exam  Vitals and nursing note reviewed.   Constitutional:       General: He is not in acute distress.     Appearance: He is well-developed. He is not diaphoretic.   HENT:      Head: Normocephalic and atraumatic.      Right Ear: External ear normal.      Left Ear: External ear normal.      Nose: Nose normal.   Eyes:      Conjunctiva/sclera: Conjunctivae normal.   Neck:      Vascular: No JVD.      Trachea: No tracheal deviation.   Cardiovascular:      Rate and Rhythm: Normal rate and regular rhythm.      Heart sounds: Normal heart sounds. No murmur heard.  Pulmonary:      Effort: Pulmonary effort is normal. No respiratory distress.      Breath sounds: Normal breath sounds. No wheezing.   Abdominal:      Palpations: Abdomen is soft.       Tenderness: There is no abdominal tenderness.   Musculoskeletal:         General: No deformity. Normal range of motion.      Cervical back: Normal range of motion and neck supple.   Skin:     General: Skin is warm and dry.      Coloration: Skin is not pale.      Findings: No erythema or rash.   Neurological:      Mental Status: He is alert and oriented to person, place, and time.      Cranial Nerves: No cranial nerve deficit.   Psychiatric:         Behavior: Behavior normal.         Thought Content: Thought content normal.         Procedures       Results for orders placed or performed during the hospital encounter of 11/07/23   Comprehensive Metabolic Panel    Specimen: Arm, Right; Blood   Result Value Ref Range    Glucose 82 65 - 99 mg/dL    BUN 61 (H) 8 - 23 mg/dL    Creatinine 3.03 (H) 0.76 - 1.27 mg/dL    Sodium 140 136 - 145 mmol/L    Potassium 5.8 (H) 3.5 - 5.2 mmol/L    Chloride 110 (H) 98 - 107 mmol/L    CO2 20.4 (L) 22.0 - 29.0 mmol/L    Calcium 9.2 8.6 - 10.5 mg/dL    Total Protein 7.0 6.0 - 8.5 g/dL    Albumin 4.2 3.5 - 5.2 g/dL    ALT (SGPT) 11 1 - 41 U/L    AST (SGOT) 15 1 - 40 U/L    Alkaline Phosphatase 87 39 - 117 U/L    Total Bilirubin 0.4 0.0 - 1.2 mg/dL    Globulin 2.8 gm/dL    A/G Ratio 1.5 g/dL    BUN/Creatinine Ratio 20.1 7.0 - 25.0    Anion Gap 9.6 5.0 - 15.0 mmol/L    eGFR 21.9 (L) >60.0 mL/min/1.73   Urinalysis With Microscopic If Indicated (No Culture) - Urine, Clean Catch    Specimen: Urine, Clean Catch   Result Value Ref Range    Color, UA Yellow Yellow, Straw    Appearance, UA Clear Clear    pH, UA <=5.0 5.0 - 8.0    Specific Gravity, UA 1.015 1.005 - 1.030    Glucose,  mg/dL (2+) (A) Negative    Ketones, UA Negative Negative    Bilirubin, UA Negative Negative    Blood, UA Negative Negative    Protein, UA Negative Negative    Leuk Esterase, UA Negative Negative    Nitrite, UA Negative Negative    Urobilinogen, UA 0.2 E.U./dL 0.2 - 1.0 E.U./dL   Magnesium    Specimen: Arm, Right;  Blood   Result Value Ref Range    Magnesium 1.3 (L) 1.6 - 2.4 mg/dL   CBC Auto Differential    Specimen: Arm, Right; Blood   Result Value Ref Range    WBC 8.40 3.40 - 10.80 10*3/mm3    RBC 3.86 (L) 4.14 - 5.80 10*6/mm3    Hemoglobin 11.8 (L) 13.0 - 17.7 g/dL    Hematocrit 39.0 37.5 - 51.0 %    .0 (H) 79.0 - 97.0 fL    MCH 30.6 26.6 - 33.0 pg    MCHC 30.3 (L) 31.5 - 35.7 g/dL    RDW 12.6 12.3 - 15.4 %    RDW-SD 47.0 37.0 - 54.0 fl    MPV 9.9 6.0 - 12.0 fL    Platelets 244 140 - 450 10*3/mm3    Neutrophil % 61.3 42.7 - 76.0 %    Lymphocyte % 27.3 19.6 - 45.3 %    Monocyte % 7.0 5.0 - 12.0 %    Eosinophil % 3.2 0.3 - 6.2 %    Basophil % 0.7 0.0 - 1.5 %    Immature Grans % 0.5 0.0 - 0.5 %    Neutrophils, Absolute 5.15 1.70 - 7.00 10*3/mm3    Lymphocytes, Absolute 2.29 0.70 - 3.10 10*3/mm3    Monocytes, Absolute 0.59 0.10 - 0.90 10*3/mm3    Eosinophils, Absolute 0.27 0.00 - 0.40 10*3/mm3    Basophils, Absolute 0.06 0.00 - 0.20 10*3/mm3    Immature Grans, Absolute 0.04 0.00 - 0.05 10*3/mm3    nRBC 0.0 0.0 - 0.2 /100 WBC   CK    Specimen: Arm, Right; Blood   Result Value Ref Range    Creatine Kinase 83 20 - 200 U/L   Basic Metabolic Panel    Specimen: Hand, Right; Blood   Result Value Ref Range    Glucose 86 65 - 99 mg/dL    BUN 57 (H) 8 - 23 mg/dL    Creatinine 2.69 (H) 0.76 - 1.27 mg/dL    Sodium 140 136 - 145 mmol/L    Potassium 5.9 (H) 3.5 - 5.2 mmol/L    Chloride 115 (H) 98 - 107 mmol/L    CO2 16.1 (L) 22.0 - 29.0 mmol/L    Calcium 8.5 (L) 8.6 - 10.5 mg/dL    BUN/Creatinine Ratio 21.2 7.0 - 25.0    Anion Gap 8.9 5.0 - 15.0 mmol/L    eGFR 25.3 (L) >60.0 mL/min/1.73   Green Top (Gel)   Result Value Ref Range    Extra Tube Hold for add-ons.    Lavender Top   Result Value Ref Range    Extra Tube hold for add-on    Gold Top - SST   Result Value Ref Range    Extra Tube Hold for add-ons.    Light Blue Top   Result Value Ref Range    Extra Tube Hold for add-ons.           ED Course  ED Course as of 11/07/23 1319   Tue  Nov 07, 2023   0726 ECG 12 Lead Electrolyte Imbalance  NSR< rate 71, , no acute ST or T wave changes []   0741 Discussed with nephrology Dr. Montero who is the patient's nephrologist.  Creatinine of 3 is high for this patient.  Given the fact that the patient does also have findings of hyperkalemia states that the patient may have to come in for JANNETH. [RB]   0830 CT Abdomen Pelvis Without Contrast  FINDINGS:    LUNG BASES:  Unremarkable as visualized.  No mass.  No consolidation.      ABDOMEN:    LIVER:  Unremarkable as visualized.    GALLBLADDER AND BILE DUCTS:  Unremarkable as visualized.  No calcified  stones.  No ductal dilation.    PANCREAS:  Unremarkable as visualized.  No ductal dilation.    SPLEEN:  Unremarkable as visualized.  No splenomegaly.    ADRENALS:  Unremarkable as visualized.  No mass.    KIDNEYS AND URETERS:  Unremarkable as visualized.  No obstructing  stones.  No hydronephrosis.    STOMACH AND BOWEL:  Scattered diverticula in the colon.  No  diverticulitis.  No obstruction.      PELVIS:    APPENDIX:  No findings to suggest acute appendicitis.    BLADDER:  Unremarkable as visualized.  No stones.    REPRODUCTIVE:  Unremarkable as visualized.      ABDOMEN and PELVIS:    INTRAPERITONEAL SPACE:  Unremarkable as visualized.  No free air.  No  significant fluid collection.    BONES/JOINTS:  Degenerative disc disease throughout the lumbar spine.   Degenerative facet arthropathy throughout the lumbar spine, most  prominent in the lower lumbar spine.  No acute fracture.  No  dislocation.    SOFT TISSUES:  Unremarkable as visualized.    VASCULATURE:  Atherosclerotic disease.  No abdominal aortic aneurysm.    LYMPH NODES:  Unremarkable as visualized.  No enlarged lymph nodes.     IMPRESSION:    Degenerative changes lumbar spine as described.   [AH]   5997 Contacted nephrology office for most recent labs - on 9/18/23 patient had BUN of 41 and creatinine of 1.82 []   1315 Dr. Catalan to admit [AH]       ED Course User Index  [AH] Lety Pastrana PA  [CW] Nicolas Manjarrez DO  [RB] Carson Azul II, PA                                           Medical Decision Making  68-year-old male who presents to the ED today for hyperkalemia.  He states he received a phone call that his potassium was 6.9 on a recent lab check.  He does have a history of stage III chronic kidney disease.  On his last check his creatinine was 1.82.  This was in September.  His initial creatinine today was 3 with a potassium of 5.8.  He was given Lokelma as well as 2 L of normal saline.  Recheck BMP showed that his creatinine went down to 2.6 however his potassium remained at 5.9.  Therefore decision was made for the need for admission.  Patient is noted to be on lisinopril 20 mg for blood pressure control.  This is most likely a contributing factor to his worsening renal disease.  This was held while he was in the ED and he was given nifedipine and hydralazine for his hypertension.  I spoke with Dr. Catalan with our hospitalist service team and the patient will be admitted.    Problems Addressed:  JANNETH (acute kidney injury): complicated acute illness or injury  Hyperkalemia: complicated acute illness or injury    Amount and/or Complexity of Data Reviewed  Labs: ordered.  Radiology: ordered. Decision-making details documented in ED Course.  ECG/medicine tests: ordered. Decision-making details documented in ED Course.    Risk  Prescription drug management.  Decision regarding hospitalization.        Final diagnoses:   JANNETH (acute kidney injury)   Hyperkalemia       ED Disposition  ED Disposition       ED Disposition   Decision to Admit    Condition   --    Comment   Level of Care: Telemetry [5]   Diagnosis: JANNETH (acute kidney injury) [126382]   Admitting Physician: MAXIMINO CATALAN [[id number]]   Attending Physician: MAXIMINO CATALAN [[id number]]                 No follow-up provider specified.       Medication List      No changes were made to your  prescriptions during this visit.            Lety Pastrana, PA  11/07/23 4520

## 2023-11-07 NOTE — ED NOTES
Assessing patient at this time. Patient states that he was called by his PCP stating that his potassium was high and to come to the ER. Patient reports no complaints. Patient denies chest pain and SOA. Patient remains on cardiac monitor and vital sign monitoring. Left AC IV flushed and saline locked. Patient provided with urinal and explained that a urine sample is needed and to notify ED staff when able to provide one.

## 2023-11-08 LAB
ANION GAP SERPL CALCULATED.3IONS-SCNC: 9.8 MMOL/L (ref 5–15)
BUN SERPL-MCNC: 52 MG/DL (ref 8–23)
BUN/CREAT SERPL: 20.5 (ref 7–25)
CALCIUM SPEC-SCNC: 8.4 MG/DL (ref 8.6–10.5)
CHLORIDE SERPL-SCNC: 113 MMOL/L (ref 98–107)
CO2 SERPL-SCNC: 20.2 MMOL/L (ref 22–29)
CREAT SERPL-MCNC: 2.54 MG/DL (ref 0.76–1.27)
DEPRECATED RDW RBC AUTO: 45.2 FL (ref 37–54)
EGFRCR SERPLBLD CKD-EPI 2021: 27.1 ML/MIN/1.73
ERYTHROCYTE [DISTWIDTH] IN BLOOD BY AUTOMATED COUNT: 12.5 % (ref 12.3–15.4)
GLUCOSE BLDC GLUCOMTR-MCNC: 104 MG/DL (ref 70–130)
GLUCOSE BLDC GLUCOMTR-MCNC: 136 MG/DL (ref 70–130)
GLUCOSE BLDC GLUCOMTR-MCNC: 155 MG/DL (ref 70–130)
GLUCOSE BLDC GLUCOMTR-MCNC: 76 MG/DL (ref 70–130)
GLUCOSE BLDC GLUCOMTR-MCNC: 98 MG/DL (ref 70–130)
GLUCOSE SERPL-MCNC: 90 MG/DL (ref 65–99)
HCT VFR BLD AUTO: 32.1 % (ref 37.5–51)
HGB BLD-MCNC: 10.1 G/DL (ref 13–17.7)
MCH RBC QN AUTO: 31 PG (ref 26.6–33)
MCHC RBC AUTO-ENTMCNC: 31.5 G/DL (ref 31.5–35.7)
MCV RBC AUTO: 98.5 FL (ref 79–97)
PLATELET # BLD AUTO: 198 10*3/MM3 (ref 140–450)
PMV BLD AUTO: 9.9 FL (ref 6–12)
POTASSIUM SERPL-SCNC: 5 MMOL/L (ref 3.5–5.2)
QT INTERVAL: 404 MS
QTC INTERVAL: 439 MS
RBC # BLD AUTO: 3.26 10*6/MM3 (ref 4.14–5.8)
SODIUM SERPL-SCNC: 143 MMOL/L (ref 136–145)
WBC NRBC COR # BLD: 7.14 10*3/MM3 (ref 3.4–10.8)

## 2023-11-08 PROCEDURE — 63710000001 INSULIN GLARGINE PER 5 UNITS: Performed by: INTERNAL MEDICINE

## 2023-11-08 PROCEDURE — G0378 HOSPITAL OBSERVATION PER HR: HCPCS

## 2023-11-08 PROCEDURE — 99231 SBSQ HOSP IP/OBS SF/LOW 25: CPT

## 2023-11-08 PROCEDURE — 80048 BASIC METABOLIC PNL TOTAL CA: CPT | Performed by: INTERNAL MEDICINE

## 2023-11-08 PROCEDURE — 25010000002 HEPARIN (PORCINE) PER 1000 UNITS: Performed by: INTERNAL MEDICINE

## 2023-11-08 PROCEDURE — 82948 REAGENT STRIP/BLOOD GLUCOSE: CPT

## 2023-11-08 PROCEDURE — 63710000001 INSULIN LISPRO (HUMAN) PER 5 UNITS

## 2023-11-08 PROCEDURE — 25010000002 ONDANSETRON PER 1 MG

## 2023-11-08 PROCEDURE — 85027 COMPLETE CBC AUTOMATED: CPT | Performed by: INTERNAL MEDICINE

## 2023-11-08 RX ORDER — ACETAMINOPHEN 325 MG/1
650 TABLET ORAL EVERY 6 HOURS PRN
Status: DISCONTINUED | OUTPATIENT
Start: 2023-11-08 | End: 2023-11-10 | Stop reason: HOSPADM

## 2023-11-08 RX ORDER — HYDRALAZINE HYDROCHLORIDE 50 MG/1
50 TABLET, FILM COATED ORAL 3 TIMES DAILY
Status: DISCONTINUED | OUTPATIENT
Start: 2023-11-08 | End: 2023-11-10

## 2023-11-08 RX ORDER — ONDANSETRON 2 MG/ML
4 INJECTION INTRAMUSCULAR; INTRAVENOUS EVERY 6 HOURS PRN
Status: DISCONTINUED | OUTPATIENT
Start: 2023-11-08 | End: 2023-11-10 | Stop reason: HOSPADM

## 2023-11-08 RX ADMIN — HYDRALAZINE HYDROCHLORIDE 50 MG: 50 TABLET, FILM COATED ORAL at 14:31

## 2023-11-08 RX ADMIN — HEPARIN SODIUM 5000 UNITS: 5000 INJECTION INTRAVENOUS; SUBCUTANEOUS at 21:12

## 2023-11-08 RX ADMIN — SODIUM BICARBONATE 75 MEQ: 84 INJECTION, SOLUTION INTRAVENOUS at 21:45

## 2023-11-08 RX ADMIN — SODIUM BICARBONATE 75 MEQ: 84 INJECTION, SOLUTION INTRAVENOUS at 12:28

## 2023-11-08 RX ADMIN — Medication 10 ML: at 20:09

## 2023-11-08 RX ADMIN — HEPARIN SODIUM 5000 UNITS: 5000 INJECTION INTRAVENOUS; SUBCUTANEOUS at 05:11

## 2023-11-08 RX ADMIN — ACETAMINOPHEN 650 MG: 325 TABLET ORAL at 02:44

## 2023-11-08 RX ADMIN — INSULIN LISPRO 2 UNITS: 100 INJECTION, SOLUTION INTRAVENOUS; SUBCUTANEOUS at 20:14

## 2023-11-08 RX ADMIN — Medication 10 ML: at 08:43

## 2023-11-08 RX ADMIN — HYDRALAZINE HYDROCHLORIDE 50 MG: 50 TABLET, FILM COATED ORAL at 20:08

## 2023-11-08 RX ADMIN — ONDANSETRON 4 MG: 2 INJECTION INTRAMUSCULAR; INTRAVENOUS at 12:28

## 2023-11-08 RX ADMIN — PANTOPRAZOLE SODIUM 40 MG: 40 TABLET, DELAYED RELEASE ORAL at 05:11

## 2023-11-08 RX ADMIN — HYDRALAZINE HYDROCHLORIDE 50 MG: 50 TABLET, FILM COATED ORAL at 08:46

## 2023-11-08 RX ADMIN — CALCITRIOL 0.25 MCG: 0.25 CAPSULE ORAL at 08:43

## 2023-11-08 RX ADMIN — INSULIN GLARGINE 10 UNITS: 100 INJECTION, SOLUTION SUBCUTANEOUS at 20:09

## 2023-11-08 RX ADMIN — TAMSULOSIN HYDROCHLORIDE 0.4 MG: 0.4 CAPSULE ORAL at 08:43

## 2023-11-08 RX ADMIN — HEPARIN SODIUM 5000 UNITS: 5000 INJECTION INTRAVENOUS; SUBCUTANEOUS at 13:07

## 2023-11-08 RX ADMIN — SODIUM BICARBONATE 75 MEQ: 84 INJECTION, SOLUTION INTRAVENOUS at 05:38

## 2023-11-08 RX ADMIN — ATORVASTATIN CALCIUM 10 MG: 10 TABLET, FILM COATED ORAL at 08:43

## 2023-11-08 NOTE — PLAN OF CARE
Goal Outcome Evaluation:   Patient has been pleasant. Compliant with care and medications as ordered. Patient is currently resting in bed. No S&S of distress noted at this time. IV with fluids infusing as ordered. Will continue plan of care.

## 2023-11-08 NOTE — PLAN OF CARE
Goal Outcome Evaluation:      Patient has been resting in bed this shift. No signs or symptoms of acute distress noted. Patient complained of a headache, relieved with newly ordered PRN Tylenol. Will continue with plan of care.

## 2023-11-08 NOTE — PROGRESS NOTES
Patient Identification:  Name:  Moe Smith  Age:  68 y.o.  Sex:  male  :  1955  MRN:  [id number]  Visit Number:  [id number]  Primary Care Provider:  Mendoza Sharma MD    Length of stay:  0    Subjective/Interval History/Consultants/Procedures     Chief Complaint:   Chief Complaint   Patient presents with    Abnormal Lab       Subjective/Interval History:    68 y.o. male who was admitted on 2023 with JANNETH, hyperkalemia     PMH is significant for  CKD stage IIIa, insulin-dependent type 2 diabetes mellitus, essential hypertension, hyperlipidemia, and obesity by BMI   For complete admission information, please see history and physical.     Consultations:  nephrology     Procedures/Scans:  CT abdomen pelvis without contrast    Today, the patient  was resting comfortably. He reported he didn't sleep well though at the time of exam stated he felt well overall. Later in the day complaining of some nausea and did add PRN zofran. Denied any palpitations or chest pain.    Room location at the time of evaluation was 304b.    ----------------------------------------------------------------------------------------------------------------------  Current [facility] Meds:  atorvastatin, 10 mg, Oral, Daily  calcitriol, 0.25 mcg, Oral, Once per day on   [START ON 2023] cloNIDine, 1 patch, Transdermal, Weekly  heparin (porcine), 5,000 Units, Subcutaneous, Q8H  hydrALAZINE, 50 mg, Oral, TID  insulin glargine, 10 Units, Subcutaneous, Nightly  insulin lispro, 2-7 Units, Subcutaneous, 4x Daily AC & at Bedtime  pantoprazole, 40 mg, Oral, Q AM  senna-docusate sodium, 2 tablet, Oral, BID  sodium chloride, 10 mL, Intravenous, Q12H  tamsulosin, 0.4 mg, Oral, Daily      sodium bicarbonate 8.4 % 75 mEq in sodium chloride 0.45 % 1,000 mL infusion (greater than 75 mEq), 75 mEq, Last Rate: 150 mL/hr at 23  0749      ----------------------------------------------------------------------------------------------------------------------      Objective     Vital Signs:  Temp:  [97.7 °F (36.5 °C)-98.4 °F (36.9 °C)] 98.4 °F (36.9 °C)  Heart Rate:  [61-85] 65  Resp:  [16-20] 20  BP: (109-192)/() 158/76      11/07/23  0642 11/07/23  1429 11/08/23  0545   Weight: 124 kg (273 lb) 126 kg (278 lb) 129 kg (283 lb 8 oz)     Body mass index is 40.68 kg/m².    Intake/Output Summary (Last 24 hours) at 11/8/2023 0804  Last data filed at 11/8/2023 0426  Gross per 24 hour   Intake 4170 ml   Output 1000 ml   Net 3170 ml     No intake/output data recorded.  Diet: Diabetic Diets, Renal Diets; Consistent Carbohydrate; Low Potassium; Texture: Regular Texture (IDDSI 7); Fluid Consistency: Thin (IDDSI 0)  ----------------------------------------------------------------------------------------------------------------------    Physical Exam  Vitals and nursing note reviewed.   Constitutional:       General: He is not in acute distress.     Appearance: He is obese.   HENT:      Head: Normocephalic and atraumatic.   Eyes:      Extraocular Movements: Extraocular movements intact.      Conjunctiva/sclera: Conjunctivae normal.   Cardiovascular:      Rate and Rhythm: Normal rate.   Musculoskeletal:      Right lower leg: No edema.      Left lower leg: No edema.   Skin:     General: Skin is warm.   Neurological:      Mental Status: He is alert. Mental status is at baseline.   Psychiatric:         Mood and Affect: Mood normal.         Behavior: Behavior normal.                ----------------------------------------------------------------------------------------------------------------------  Tele:      ----------------------------------------------------------------------------------------------------------------------  Results from last 7 days   Lab Units 11/07/23  0701   CK TOTAL U/L 83     Results from last 7 days   Lab Units 11/08/23  0119  "11/07/23 0701   WBC 10*3/mm3 7.14 8.40   HEMOGLOBIN g/dL 10.1* 11.8*   HEMATOCRIT % 32.1* 39.0   MCV fL 98.5* 101.0*   MCHC g/dL 31.5 30.3*   PLATELETS 10*3/mm3 198 244         Results from last 7 days   Lab Units 11/08/23  0119 11/07/23  1203 11/07/23  0701   SODIUM mmol/L 143 140 140   POTASSIUM mmol/L 5.0 5.9* 5.8*   MAGNESIUM mg/dL  --   --  1.3*   CHLORIDE mmol/L 113* 115* 110*   CO2 mmol/L 20.2* 16.1* 20.4*   BUN mg/dL 52* 57* 61*   CREATININE mg/dL 2.54* 2.69* 3.03*   CALCIUM mg/dL 8.4* 8.5* 9.2   GLUCOSE mg/dL 90 86 82   ALBUMIN g/dL  --   --  4.2   BILIRUBIN mg/dL  --   --  0.4   ALK PHOS U/L  --   --  87   AST (SGOT) U/L  --   --  15   ALT (SGPT) U/L  --   --  11   Estimated Creatinine Clearance: 38.6 mL/min (A) (by C-G formula based on SCr of 2.54 mg/dL (H)).  No results found for: \"AMMONIA\"      No results found for: \"BLOODCX\"  No results found for: \"URINECX\"  No results found for: \"WOUNDCX\"  No results found for: \"STOOLCX\"  ----------------------------------------------------------------------------------------------------------------------  Imaging Results (Last 24 Hours)       Procedure Component Value Units Date/Time    CT Abdomen Pelvis Without Contrast [[id number]] Collected: 11/07/23 0820     Updated: 11/07/23 0822    Narrative:      EXAM:    CT Abdomen and Pelvis Without Intravenous Contrast     EXAM DATE:    11/7/2023 8:51 AM     CLINICAL HISTORY:    JANNETH     TECHNIQUE:    Axial computed tomography images of the abdomen and pelvis without  intravenous contrast.  Sagittal and coronal reformatted images were  created and reviewed.  This CT exam was performed using one or more of  the following dose reduction techniques:  automated exposure control,  adjustment of the mA and/or kV according to patient size, and/or use of  iterative reconstruction technique.     COMPARISON:    6/14/2023     FINDINGS:    LUNG BASES:  Unremarkable as visualized.  No mass.  No consolidation.      ABDOMEN:    LIVER:  " Unremarkable as visualized.    GALLBLADDER AND BILE DUCTS:  Unremarkable as visualized.  No calcified  stones.  No ductal dilation.    PANCREAS:  Unremarkable as visualized.  No ductal dilation.    SPLEEN:  Unremarkable as visualized.  No splenomegaly.    ADRENALS:  Unremarkable as visualized.  No mass.    KIDNEYS AND URETERS:  Unremarkable as visualized.  No obstructing  stones.  No hydronephrosis.    STOMACH AND BOWEL:  Scattered diverticula in the colon.  No  diverticulitis.  No obstruction.      PELVIS:    APPENDIX:  No findings to suggest acute appendicitis.    BLADDER:  Unremarkable as visualized.  No stones.    REPRODUCTIVE:  Unremarkable as visualized.      ABDOMEN and PELVIS:    INTRAPERITONEAL SPACE:  Unremarkable as visualized.  No free air.  No  significant fluid collection.    BONES/JOINTS:  Degenerative disc disease throughout the lumbar spine.   Degenerative facet arthropathy throughout the lumbar spine, most  prominent in the lower lumbar spine.  No acute fracture.  No  dislocation.    SOFT TISSUES:  Unremarkable as visualized.    VASCULATURE:  Atherosclerotic disease.  No abdominal aortic aneurysm.    LYMPH NODES:  Unremarkable as visualized.  No enlarged lymph nodes.       Impression:        Degenerative changes lumbar spine as described.        This report was finalized on 11/7/2023 8:20 AM by Dr. Raul Buchanan MD.             ----------------------------------------------------------------------------------------------------------------------   I have reviewed the above laboratory values for 11/08/23    Assessment/Plan     Active Hospital Problems    Diagnosis  POA    **JANNETH (acute kidney injury) [N17.9]  Yes         ASSESSMENT/PLAN:    JANNETH on CKD stage IIIa  Metabolic acidosis, 2/2 above  Hyperkalemia, resolved  Hypomagnesemia  Patient presented at the direction of nephrology due to laboratory abnormalities.  Creatinine 3.03 on admission with baseline 1.2-1.5.  Now improved to 2.54.  Continuing  fluid replacement with sodium bicarb in half normal saline.  Avoid nephrotoxins as able  Repeat BMP and magnesium in the a.m.  Continue to correct electrolyte abnormalities as indicated  Nephrology consulted, assistance appreciated.  Follow-up recommendations.  Recent bilateral renal ultrasound only noted simple cyst right kidney.  Monitor per telemetry.    Chronic:  IDDM 2  Hypertension  Hyperlipidemia  Home med regimen continued as indicated  A1c is 7.  Continue 10 units Lantus nightly as well as low-dose sliding scale.  Monitor and adjust as needed.  -----------  -DVT prophylaxis: Subcu heparin  -Disposition plans/anticipated needs: Pending course, likely return home 24-48 hours.        The patient is high risk due to the following diagnoses/reasons: JANNETH, hyperkalemia, hypomagnesemia        Sathish Alford PA-C  11/08/23  08:04 EST

## 2023-11-08 NOTE — CONSULTS
"Nephrology Consult Note    Referring Provider: Dr. Catalan  Reason for Consultation: Elevated creatinine, elevated potassium    Subjective       History of present illness:  Moe Smith is a 68 y.o. male who presented to [facility] emergency department with chief complaint of not feeling well, generalized weakness and elevated potassium level.  Patient was sent to the hospital after having a high potassium level.  Patient is known to have a chronic kidney disease with baseline creatinine appears to be 1.5\" and follows up outpatient nephrology clinic.  Patient has been taking lisinopril that he sees the dose has not been changed recently.  Patient stated he has been eating potatoes for the past few days.  On arrival his creatinine was found to be 3 that has dropped to 2.5 this morning.  Patient's initial potassium was 5.9 that has improved.  Denied chronic NSAIDS use. Patient denies hematuria, dysuria, difficulty passing urine. No prior history of renal stones. No family history of renal disease    History  History reviewed. No pertinent past medical history., No past surgical history on file., History reviewed. No pertinent family history.,   Social History     Tobacco Use    Smoking status: Never    Smokeless tobacco: Never   Vaping Use    Vaping Use: Never used   Substance Use Topics    Alcohol use: Never    Drug use: Never   ,   Medications Prior to Admission   Medication Sig Dispense Refill Last Dose    atorvastatin (LIPITOR) 10 MG tablet Take 1 tablet by mouth Daily.   11/6/2023    calcitriol (ROCALTROL) 0.25 MCG capsule Take 1 capsule by mouth 3 (Three) Times a Week.   11/6/2023    cloNIDine (CATAPRES-TTS) 0.3 MG/24HR patch Place 1 patch on the skin as directed by provider 1 (One) Time Per Week.   11/5/2023    dapagliflozin Propanediol (Farxiga) 10 MG tablet Take 10 mg by mouth Daily.   11/6/2023    gabapentin (NEURONTIN) 400 MG capsule Take 1 capsule by mouth 3 (Three) Times a Day As Needed (nerve " pain).   11/6/2023    glimepiride (AMARYL) 4 MG tablet Take 2 tablets by mouth Every Morning Before Breakfast.   11/6/2023    insulin degludec (Tresiba FlexTouch) 100 UNIT/ML solution pen-injector injection Inject 10 Units under the skin into the appropriate area as directed Every Night.   11/6/2023    lisinopril (PRINIVIL,ZESTRIL) 20 MG tablet Take 1 tablet by mouth 2 (Two) Times a Day.   11/6/2023    omeprazole (priLOSEC) 20 MG capsule Take 1 capsule by mouth Daily.   11/6/2023    tamsulosin (FLOMAX) 0.4 MG capsule 24 hr capsule Take 1 capsule by mouth Daily.   11/6/2023   , Scheduled Meds:  atorvastatin, 10 mg, Oral, Daily  calcitriol, 0.25 mcg, Oral, Once per day on Mon Wed Fri  [START ON 11/12/2023] cloNIDine, 1 patch, Transdermal, Weekly  heparin (porcine), 5,000 Units, Subcutaneous, Q8H  hydrALAZINE, 50 mg, Oral, TID  insulin glargine, 10 Units, Subcutaneous, Nightly  insulin lispro, 2-7 Units, Subcutaneous, 4x Daily AC & at Bedtime  pantoprazole, 40 mg, Oral, Q AM  senna-docusate sodium, 2 tablet, Oral, BID  sodium chloride, 10 mL, Intravenous, Q12H  tamsulosin, 0.4 mg, Oral, Daily    , Continuous Infusions:  sodium bicarbonate 8.4 % 75 mEq in sodium chloride 0.45 % 1,000 mL infusion (greater than 75 mEq), 75 mEq, Last Rate: 75 mEq (11/08/23 0538)    , PRN Meds:    acetaminophen    senna-docusate sodium **AND** polyethylene glycol **AND** bisacodyl **AND** bisacodyl    dextrose    dextrose    glucagon (human recombinant)    [COMPLETED] Insert Peripheral IV **AND** sodium chloride    sodium chloride    sodium chloride and Allergies:  Sulfa antibiotics    Review of Systems  More than 10 point review of systems was done. Pertinent items are noted in HPI, all other systems reviewed and negative    Objective     Vital Signs  Temp:  [97.7 °F (36.5 °C)-98.4 °F (36.9 °C)] 98.4 °F (36.9 °C)  Heart Rate:  [61-85] 65  Resp:  [16-20] 20  BP: (109-192)/() 158/76    Intake/Output                   11/07/23 0701 -  "11/08/23 0700     0220-1619 4082-4610 Total              Intake    P.O.  --  120 120    I.V.  50  2000 2050    IV Piggyback  2000  -- 2000    Total Intake 2050 2120 4170       Output    Urine  --  1000 1000    Total Output -- 1000 1000             Physical Examination:  General Appearance: not in acute distress  No JVD  Lungs: Clear to auscultation, respirations regular and unlabored  Heart: Regular rhythm & normal rate, normal S1, S2, no murmur, no gallop, no rub   Abdomen: Normal bowel sounds, no masses and soft non-tender  Extremities: No edema  Neurologic: Orientated to person, place, time, grossly no focal deficitis    Laboratory Data :      WBC WBC   Date Value Ref Range Status   11/08/2023 7.14 3.40 - 10.80 10*3/mm3 Final   11/07/2023 8.40 3.40 - 10.80 10*3/mm3 Final      HGB Hemoglobin   Date Value Ref Range Status   11/08/2023 10.1 (L) 13.0 - 17.7 g/dL Final   11/07/2023 11.8 (L) 13.0 - 17.7 g/dL Final      HCT Hematocrit   Date Value Ref Range Status   11/08/2023 32.1 (L) 37.5 - 51.0 % Final   11/07/2023 39.0 37.5 - 51.0 % Final      Platlets No results found for: \"LABPLAT\"   MCV MCV   Date Value Ref Range Status   11/08/2023 98.5 (H) 79.0 - 97.0 fL Final   11/07/2023 101.0 (H) 79.0 - 97.0 fL Final          Sodium Sodium   Date Value Ref Range Status   11/08/2023 143 136 - 145 mmol/L Final   11/07/2023 140 136 - 145 mmol/L Final   11/07/2023 140 136 - 145 mmol/L Final      Potassium Potassium   Date Value Ref Range Status   11/08/2023 5.0 3.5 - 5.2 mmol/L Final   11/07/2023 5.9 (H) 3.5 - 5.2 mmol/L Final     Comment:     Slight hemolysis detected by analyzer. Result may be falsely elevated.   11/07/2023 5.8 (H) 3.5 - 5.2 mmol/L Final      Chloride Chloride   Date Value Ref Range Status   11/08/2023 113 (H) 98 - 107 mmol/L Final   11/07/2023 115 (H) 98 - 107 mmol/L Final   11/07/2023 110 (H) 98 - 107 mmol/L Final      CO2 CO2   Date Value Ref Range Status   11/08/2023 20.2 (L) 22.0 - 29.0 mmol/L Final " "  11/07/2023 16.1 (L) 22.0 - 29.0 mmol/L Final   11/07/2023 20.4 (L) 22.0 - 29.0 mmol/L Final      BUN BUN   Date Value Ref Range Status   11/08/2023 52 (H) 8 - 23 mg/dL Final   11/07/2023 57 (H) 8 - 23 mg/dL Final   11/07/2023 61 (H) 8 - 23 mg/dL Final      Creatinine Creatinine   Date Value Ref Range Status   11/08/2023 2.54 (H) 0.76 - 1.27 mg/dL Final   11/07/2023 2.69 (H) 0.76 - 1.27 mg/dL Final   11/07/2023 3.03 (H) 0.76 - 1.27 mg/dL Final      Calcium Calcium   Date Value Ref Range Status   11/08/2023 8.4 (L) 8.6 - 10.5 mg/dL Final   11/07/2023 8.5 (L) 8.6 - 10.5 mg/dL Final   11/07/2023 9.2 8.6 - 10.5 mg/dL Final      PO4 No results found for: \"CAPO4\"   Albumin Albumin   Date Value Ref Range Status   11/07/2023 4.2 3.5 - 5.2 g/dL Final      Magnesium Magnesium   Date Value Ref Range Status   11/07/2023 1.3 (L) 1.6 - 2.4 mg/dL Final      Uric Acid No results found for: \"URICACID\"     Radiology results :     Imaging Results (Last 72 Hours)       Procedure Component Value Units Date/Time    CT Abdomen Pelvis Without Contrast [[id number]] Collected: 11/07/23 0820     Updated: 11/07/23 0822    Narrative:      EXAM:    CT Abdomen and Pelvis Without Intravenous Contrast     EXAM DATE:    11/7/2023 8:51 AM     CLINICAL HISTORY:    JANNETH     TECHNIQUE:    Axial computed tomography images of the abdomen and pelvis without  intravenous contrast.  Sagittal and coronal reformatted images were  created and reviewed.  This CT exam was performed using one or more of  the following dose reduction techniques:  automated exposure control,  adjustment of the mA and/or kV according to patient size, and/or use of  iterative reconstruction technique.     COMPARISON:    6/14/2023     FINDINGS:    LUNG BASES:  Unremarkable as visualized.  No mass.  No consolidation.      ABDOMEN:    LIVER:  Unremarkable as visualized.    GALLBLADDER AND BILE DUCTS:  Unremarkable as visualized.  No calcified  stones.  No ductal dilation.    PANCREAS:  " Unremarkable as visualized.  No ductal dilation.    SPLEEN:  Unremarkable as visualized.  No splenomegaly.    ADRENALS:  Unremarkable as visualized.  No mass.    KIDNEYS AND URETERS:  Unremarkable as visualized.  No obstructing  stones.  No hydronephrosis.    STOMACH AND BOWEL:  Scattered diverticula in the colon.  No  diverticulitis.  No obstruction.      PELVIS:    APPENDIX:  No findings to suggest acute appendicitis.    BLADDER:  Unremarkable as visualized.  No stones.    REPRODUCTIVE:  Unremarkable as visualized.      ABDOMEN and PELVIS:    INTRAPERITONEAL SPACE:  Unremarkable as visualized.  No free air.  No  significant fluid collection.    BONES/JOINTS:  Degenerative disc disease throughout the lumbar spine.   Degenerative facet arthropathy throughout the lumbar spine, most  prominent in the lower lumbar spine.  No acute fracture.  No  dislocation.    SOFT TISSUES:  Unremarkable as visualized.    VASCULATURE:  Atherosclerotic disease.  No abdominal aortic aneurysm.    LYMPH NODES:  Unremarkable as visualized.  No enlarged lymph nodes.       Impression:        Degenerative changes lumbar spine as described.        This report was finalized on 11/7/2023 8:20 AM by Dr. Raul Buchanan MD.                 Medications:      atorvastatin, 10 mg, Oral, Daily  calcitriol, 0.25 mcg, Oral, Once per day on Mon Wed Fri  [START ON 11/12/2023] cloNIDine, 1 patch, Transdermal, Weekly  heparin (porcine), 5,000 Units, Subcutaneous, Q8H  hydrALAZINE, 50 mg, Oral, TID  insulin glargine, 10 Units, Subcutaneous, Nightly  insulin lispro, 2-7 Units, Subcutaneous, 4x Daily AC & at Bedtime  pantoprazole, 40 mg, Oral, Q AM  senna-docusate sodium, 2 tablet, Oral, BID  sodium chloride, 10 mL, Intravenous, Q12H  tamsulosin, 0.4 mg, Oral, Daily      sodium bicarbonate 8.4 % 75 mEq in sodium chloride 0.45 % 1,000 mL infusion (greater than 75 mEq), 75 mEq, Last Rate: 75 mEq (11/08/23 0538)        Assessment & Plan       JANNETH (acute kidney  injury)      - JANNETH, nonoliguric  - Chronic kidney disease stage III  - Normal anion gap metabolic acidosis  - Hyperkalemia  - Essential hypertension, uncontrolled  - Type 2 diabetes mellitus    JANNETH is most likely due to prerenal azotemia and hyperkalemia is likely due to ACE inhibitors.  Other differentials include impaired potassium excretion in settings of poor distal sodium delivery due to prerenal state.  Baseline creatinine appears to be 1.5, admitted with 3.  No hematuria, no proteinuria  No obstruction on renal ultrasound  - Increase hydralazine 50 mg 3 times daily  - Continue on balanced bicarb fluid for now  - Continue holding lisinopril  - Educated and counseled the patient for low K diet    - Avoid any nephrotoxic agents, hypotension and adjust medications according to estimated GFR  Thanks for the consult. Nephrology will follow the patient.   I discussed the patient's findings and my recommendations with patient and nursing staff    Jean Claude Blankenship MD  11/08/23  07:24 EST

## 2023-11-09 LAB
ANION GAP SERPL CALCULATED.3IONS-SCNC: 7.9 MMOL/L (ref 5–15)
BUN SERPL-MCNC: 47 MG/DL (ref 8–23)
BUN/CREAT SERPL: 16.6 (ref 7–25)
CALCIUM SPEC-SCNC: 8.6 MG/DL (ref 8.6–10.5)
CHLORIDE SERPL-SCNC: 110 MMOL/L (ref 98–107)
CO2 SERPL-SCNC: 26.1 MMOL/L (ref 22–29)
CREAT SERPL-MCNC: 2.83 MG/DL (ref 0.76–1.27)
DEPRECATED RDW RBC AUTO: 43.9 FL (ref 37–54)
EGFRCR SERPLBLD CKD-EPI 2021: 23.8 ML/MIN/1.73
ERYTHROCYTE [DISTWIDTH] IN BLOOD BY AUTOMATED COUNT: 12.2 % (ref 12.3–15.4)
GLUCOSE BLDC GLUCOMTR-MCNC: 128 MG/DL (ref 70–130)
GLUCOSE BLDC GLUCOMTR-MCNC: 166 MG/DL (ref 70–130)
GLUCOSE BLDC GLUCOMTR-MCNC: 184 MG/DL (ref 70–130)
GLUCOSE BLDC GLUCOMTR-MCNC: 82 MG/DL (ref 70–130)
GLUCOSE SERPL-MCNC: 77 MG/DL (ref 65–99)
HCT VFR BLD AUTO: 31.9 % (ref 37.5–51)
HGB BLD-MCNC: 10.3 G/DL (ref 13–17.7)
MAGNESIUM SERPL-MCNC: 1.4 MG/DL (ref 1.6–2.4)
MCH RBC QN AUTO: 31.5 PG (ref 26.6–33)
MCHC RBC AUTO-ENTMCNC: 32.3 G/DL (ref 31.5–35.7)
MCV RBC AUTO: 97.6 FL (ref 79–97)
PLATELET # BLD AUTO: 218 10*3/MM3 (ref 140–450)
PMV BLD AUTO: 10.1 FL (ref 6–12)
POTASSIUM SERPL-SCNC: 5.2 MMOL/L (ref 3.5–5.2)
RBC # BLD AUTO: 3.27 10*6/MM3 (ref 4.14–5.8)
SODIUM SERPL-SCNC: 144 MMOL/L (ref 136–145)
WBC NRBC COR # BLD: 8.53 10*3/MM3 (ref 3.4–10.8)

## 2023-11-09 PROCEDURE — 63710000001 INSULIN LISPRO (HUMAN) PER 5 UNITS

## 2023-11-09 PROCEDURE — 63710000001 INSULIN GLARGINE PER 5 UNITS: Performed by: INTERNAL MEDICINE

## 2023-11-09 PROCEDURE — 25010000002 MAGNESIUM SULFATE IN D5W 1G/100ML (PREMIX) 1-5 GM/100ML-% SOLUTION

## 2023-11-09 PROCEDURE — 85027 COMPLETE CBC AUTOMATED: CPT | Performed by: INTERNAL MEDICINE

## 2023-11-09 PROCEDURE — 25010000002 ONDANSETRON PER 1 MG

## 2023-11-09 PROCEDURE — 83735 ASSAY OF MAGNESIUM: CPT

## 2023-11-09 PROCEDURE — 25010000002 HEPARIN (PORCINE) PER 1000 UNITS: Performed by: INTERNAL MEDICINE

## 2023-11-09 PROCEDURE — 99231 SBSQ HOSP IP/OBS SF/LOW 25: CPT

## 2023-11-09 PROCEDURE — 82948 REAGENT STRIP/BLOOD GLUCOSE: CPT

## 2023-11-09 PROCEDURE — 80048 BASIC METABOLIC PNL TOTAL CA: CPT | Performed by: INTERNAL MEDICINE

## 2023-11-09 RX ORDER — MAGNESIUM SULFATE 1 G/100ML
1 INJECTION INTRAVENOUS ONCE
Qty: 100 ML | Refills: 0 | Status: COMPLETED | OUTPATIENT
Start: 2023-11-09 | End: 2023-11-09

## 2023-11-09 RX ADMIN — PANTOPRAZOLE SODIUM 40 MG: 40 TABLET, DELAYED RELEASE ORAL at 05:05

## 2023-11-09 RX ADMIN — ONDANSETRON 4 MG: 2 INJECTION INTRAMUSCULAR; INTRAVENOUS at 17:36

## 2023-11-09 RX ADMIN — HYDRALAZINE HYDROCHLORIDE 50 MG: 50 TABLET, FILM COATED ORAL at 16:09

## 2023-11-09 RX ADMIN — INSULIN LISPRO 4 UNITS: 100 INJECTION, SOLUTION INTRAVENOUS; SUBCUTANEOUS at 17:36

## 2023-11-09 RX ADMIN — HEPARIN SODIUM 5000 UNITS: 5000 INJECTION INTRAVENOUS; SUBCUTANEOUS at 21:23

## 2023-11-09 RX ADMIN — ONDANSETRON 4 MG: 2 INJECTION INTRAMUSCULAR; INTRAVENOUS at 23:31

## 2023-11-09 RX ADMIN — TAMSULOSIN HYDROCHLORIDE 0.4 MG: 0.4 CAPSULE ORAL at 09:31

## 2023-11-09 RX ADMIN — HYDRALAZINE HYDROCHLORIDE 50 MG: 50 TABLET, FILM COATED ORAL at 20:29

## 2023-11-09 RX ADMIN — HYDRALAZINE HYDROCHLORIDE 50 MG: 50 TABLET, FILM COATED ORAL at 09:31

## 2023-11-09 RX ADMIN — Medication 10 ML: at 21:23

## 2023-11-09 RX ADMIN — Medication 10 ML: at 09:31

## 2023-11-09 RX ADMIN — ATORVASTATIN CALCIUM 10 MG: 10 TABLET, FILM COATED ORAL at 09:31

## 2023-11-09 RX ADMIN — SODIUM ZIRCONIUM CYCLOSILICATE 10 G: 10 POWDER, FOR SUSPENSION ORAL at 09:31

## 2023-11-09 RX ADMIN — HEPARIN SODIUM 5000 UNITS: 5000 INJECTION INTRAVENOUS; SUBCUTANEOUS at 05:05

## 2023-11-09 RX ADMIN — ONDANSETRON 4 MG: 2 INJECTION INTRAMUSCULAR; INTRAVENOUS at 04:07

## 2023-11-09 RX ADMIN — INSULIN LISPRO 2 UNITS: 100 INJECTION, SOLUTION INTRAVENOUS; SUBCUTANEOUS at 20:29

## 2023-11-09 RX ADMIN — INSULIN GLARGINE 10 UNITS: 100 INJECTION, SOLUTION SUBCUTANEOUS at 20:30

## 2023-11-09 RX ADMIN — HEPARIN SODIUM 5000 UNITS: 5000 INJECTION INTRAVENOUS; SUBCUTANEOUS at 16:08

## 2023-11-09 RX ADMIN — MAGNESIUM SULFATE HEPTAHYDRATE 1 G: 1 INJECTION, SOLUTION INTRAVENOUS at 04:07

## 2023-11-09 NOTE — PLAN OF CARE
Goal Outcome Evaluation:  Patient is resting in bed watching television. Patient A&Ox4. Patient is currently on room air. Patient has tolerated all interventions. No complaints or concerns at this time. No signs of acute distress noted. Will continue to follow plan of care.

## 2023-11-09 NOTE — PROGRESS NOTES
"Patient is here today for a Mantoux (TST) test placement.    Is there a current order in the chart? No. Placed order according to standing order (reference the \"Skin Test- Tuberculosis Screening- Ambulatory Care\" standing order in Policy [employer]). Review the Inclusion and Exclusion Criteria.      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 Patient to return for second test in 1-3 weeks after first test is read.     Exclusion Criteria  None - Place order for Mantoux (TST) test per standing order.    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 No - form not needed per patient. Checking with Mom, will bring with when read on 10/7/2022 if needed.     Instructed patient to wait for 15 minutes post injection and to report any reactions immediately to staff.    Told patient to return to clinic in 48-72 hours to have Mantoux (TST) read.             " Patient Identification:  Name:  Moe Smith  Age:  68 y.o.  Sex:  male  :  1955  MRN:  [id number]  Visit Number:  [id number]  Primary Care Provider:  Mendoza Sharma MD    Length of stay:  0    Subjective/Interval History/Consultants/Procedures     Chief Complaint:   Chief Complaint   Patient presents with    Abnormal Lab       Subjective/Interval History:    68 y.o. male who was admitted on 2023 with  JANNETH, hyperkalemia      PMH is significant for CKD stage IIIa, insulin-dependent type 2 diabetes mellitus, essential hypertension, hyperlipidemia, and obesity by BMI    For complete admission information, please see history and physical.     Consultations:  nephrology      Procedures/Scans:  CT abdomen pelvis without contrast     Today, the patient was sitting up on the edge of the bed in no distress. He reports he feels well. No chest pain, no palpitations. Denies decreased urine output. Does reports some intermittent nausea well controlled with PRN zofran.      Room location at the time of evaluation was 304b.    ----------------------------------------------------------------------------------------------------------------------  Current Hospital Meds:  atorvastatin, 10 mg, Oral, Daily  calcitriol, 0.25 mcg, Oral, Once per day on   [START ON 2023] cloNIDine, 1 patch, Transdermal, Weekly  heparin (porcine), 5,000 Units, Subcutaneous, Q8H  hydrALAZINE, 50 mg, Oral, TID  insulin glargine, 10 Units, Subcutaneous, Nightly  insulin lispro, 2-7 Units, Subcutaneous, 4x Daily AC & at Bedtime  pantoprazole, 40 mg, Oral, Q AM  senna-docusate sodium, 2 tablet, Oral, BID  sodium chloride, 10 mL, Intravenous, Q12H  tamsulosin, 0.4 mg, Oral, Daily      sodium bicarbonate 8.4 % 75 mEq in sodium chloride 0.45 % 1,000 mL infusion (greater than 75 mEq), 75 mEq, Last Rate: 150 mL/hr at 23  0245      ----------------------------------------------------------------------------------------------------------------------      Objective     Vital Signs:  Temp:  [98 °F (36.7 °C)-98.3 °F (36.8 °C)] 98 °F (36.7 °C)  Heart Rate:  [59-70] 59  Resp:  [16-22] 22  BP: (154-195)/(74-92) 175/86      11/07/23  1429 11/08/23  0545 11/09/23  0503   Weight: 126 kg (278 lb) 129 kg (283 lb 8 oz) 128 kg (282 lb 8 oz)     Body mass index is 40.53 kg/m².    Intake/Output Summary (Last 24 hours) at 11/9/2023 0953  Last data filed at 11/9/2023 0359  Gross per 24 hour   Intake 480 ml   Output 1125 ml   Net -645 ml     No intake/output data recorded.  Diet: Diabetic Diets, Renal Diets; Consistent Carbohydrate; Low Potassium; Texture: Regular Texture (IDDSI 7); Fluid Consistency: Thin (IDDSI 0)  ----------------------------------------------------------------------------------------------------------------------    Physical Exam  Vitals and nursing note reviewed.   Constitutional:       General: He is not in acute distress.  HENT:      Head: Normocephalic and atraumatic.   Eyes:      Extraocular Movements: Extraocular movements intact.      Conjunctiva/sclera: Conjunctivae normal.   Cardiovascular:      Rate and Rhythm: Normal rate.   Pulmonary:      Effort: Pulmonary effort is normal.   Abdominal:      Palpations: Abdomen is soft.   Musculoskeletal:      Right lower leg: No edema.      Left lower leg: No edema.   Skin:     General: Skin is warm and dry.   Neurological:      Mental Status: He is alert. Mental status is at baseline.   Psychiatric:         Mood and Affect: Mood normal.         Behavior: Behavior normal.            ----------------------------------------------------------------------------------------------------------------------  Tele:        ----------------------------------------------------------------------------------------------------------------------  Results from last 7 days   Lab Units 11/07/23  0701  "  CK TOTAL U/L 83     Results from last 7 days   Lab Units 11/09/23  0045 11/08/23  0119 11/07/23  0701   WBC 10*3/mm3 8.53 7.14 8.40   HEMOGLOBIN g/dL 10.3* 10.1* 11.8*   HEMATOCRIT % 31.9* 32.1* 39.0   MCV fL 97.6* 98.5* 101.0*   MCHC g/dL 32.3 31.5 30.3*   PLATELETS 10*3/mm3 218 198 244         Results from last 7 days   Lab Units 11/09/23  0045 11/08/23  0119 11/07/23  1203 11/07/23  0701   SODIUM mmol/L 144 143 140 140   POTASSIUM mmol/L 5.2 5.0 5.9* 5.8*   MAGNESIUM mg/dL 1.4*  --   --  1.3*   CHLORIDE mmol/L 110* 113* 115* 110*   CO2 mmol/L 26.1 20.2* 16.1* 20.4*   BUN mg/dL 47* 52* 57* 61*   CREATININE mg/dL 2.83* 2.54* 2.69* 3.03*   CALCIUM mg/dL 8.6 8.4* 8.5* 9.2   GLUCOSE mg/dL 77 90 86 82   ALBUMIN g/dL  --   --   --  4.2   BILIRUBIN mg/dL  --   --   --  0.4   ALK PHOS U/L  --   --   --  87   AST (SGOT) U/L  --   --   --  15   ALT (SGPT) U/L  --   --   --  11   Estimated Creatinine Clearance: 34.5 mL/min (A) (by C-G formula based on SCr of 2.83 mg/dL (H)).  No results found for: \"AMMONIA\"      No results found for: \"BLOODCX\"  No results found for: \"URINECX\"  No results found for: \"WOUNDCX\"  No results found for: \"STOOLCX\"  ----------------------------------------------------------------------------------------------------------------------  Imaging Results (Last 24 Hours)       ** No results found for the last 24 hours. **          ----------------------------------------------------------------------------------------------------------------------   I have reviewed the above laboratory values for 11/09/23    Assessment/Plan     Active Hospital Problems    Diagnosis  POA    **JANNETH (acute kidney injury) [N17.9]  Yes         ASSESSMENT/PLAN:    JANNETH on CKD stage IIIa  Non-elevated anion gap Metabolic acidosis, 2/2 above, resolved   Hyperkalemia, resolved  Hypomagnesemia  Patient presented at the direction of nephrology due to laboratory abnormalities.  Creatinine 3.03 on admission with baseline 1.2-1.5.  " Uptrend overnight to 2.8  Continuing fluid replacement with sodium bicarb in half normal saline.  Avoid nephrotoxins as able  Nephrology following, assistance appreciated. Feel JANNETH likely ATN vs less likely prerenal azotemia.   Continue to correct electrolyte abnormalities as indicated. Nephrology added 1 dose lokelma today. Mag replaced overnight.   Repeat BMP and magnesium in the a.m..  Continue to hold lisinopril   Recent bilateral renal ultrasound only noted simple cyst right kidney.  Monitor per telemetry.     Chronic:  IDDM 2  Hypertension  Hyperlipidemia  Home med regimen continued as indicated  A1c is 7.  Continue 10 units Lantus nightly as well as low-dose sliding scale.  Monitor and adjust as needed. BG is well controlled      -----------  -DVT prophylaxis: Subcu heparin   -Disposition plans/anticipated needs: Pending course        The patient is high risk due to the following diagnoses/reasons:  JANNETH, electrolyte derangements         Sathish Alford PA-C  11/09/23  09:53 EST

## 2023-11-09 NOTE — PROGRESS NOTES
"Nephrology Progress Note      Subjective     Patient denied any chest pain, shortness of breath feeling much better.    Objective       Vital signs :     Temp:  [98 °F (36.7 °C)-98.3 °F (36.8 °C)] 98 °F (36.7 °C)  Heart Rate:  [59-70] 59  Resp:  [16-22] 22  BP: (154-195)/(74-92) 175/86    Intake/Output                         11/07/23 0701 - 11/08/23 0700 11/08/23 0701 - 11/09/23 0700     6916-1084 8041-9830 Total 6282-1276 8200-0054 Total                 Intake    P.O.  --  120 120  720  120 840    I.V.  50 2000 2050  --  -- --    IV Piggyback  2000  -- 2000  --  -- --    Total Intake 2050 2120 4170 720 120 840       Output    Urine  --  1000 1000  --  1125 1125    Total Output -- 1000 1000 -- 1125 1125             Physical Exam:    General Appearance : alert, pleasant, appears stated age, cooperative and alert  Lungs : clear to auscultation, respirations regular  Heart :  regular rhythm & normal rate, normal S1, S2 and no murmur, no rub  Abdomen : Soft, nondistended  Extremities : No edema,   Neurologic :   orientated to person, place, time and situation, Grossly no focal deficits        Laboratory Data :     Albumin Albumin   Date Value Ref Range Status   11/07/2023 4.2 3.5 - 5.2 g/dL Final      Magnesium Magnesium   Date Value Ref Range Status   11/09/2023 1.4 (L) 1.6 - 2.4 mg/dL Final   11/07/2023 1.3 (L) 1.6 - 2.4 mg/dL Final          PTH               No results found for: \"PTH\"    CBC and coagulation:  Results from last 7 days   Lab Units 11/09/23  0045 11/08/23  0119 11/07/23  0701   WBC 10*3/mm3 8.53 7.14 8.40   HEMOGLOBIN g/dL 10.3* 10.1* 11.8*   HEMATOCRIT % 31.9* 32.1* 39.0   MCV fL 97.6* 98.5* 101.0*   MCHC g/dL 32.3 31.5 30.3*   PLATELETS 10*3/mm3 218 198 244     Acid/base balance:      Renal and electrolytes:    Results from last 7 days   Lab Units 11/09/23  0045 11/08/23  0119 11/07/23  1203 11/07/23  0701   SODIUM mmol/L 144 143 140 140   POTASSIUM mmol/L 5.2 5.0 5.9* 5.8*   MAGNESIUM mg/dL 1.4*  " --   --  1.3*   CHLORIDE mmol/L 110* 113* 115* 110*   CO2 mmol/L 26.1 20.2* 16.1* 20.4*   BUN mg/dL 47* 52* 57* 61*   CREATININE mg/dL 2.83* 2.54* 2.69* 3.03*   CALCIUM mg/dL 8.6 8.4* 8.5* 9.2     Estimated Creatinine Clearance: 34.5 mL/min (A) (by C-G formula based on SCr of 2.83 mg/dL (H)).  @GFRCG:3@   Liver and pancreatic function:  Results from last 7 days   Lab Units 11/07/23  0701   ALBUMIN g/dL 4.2   BILIRUBIN mg/dL 0.4   ALK PHOS U/L 87   AST (SGOT) U/L 15   ALT (SGPT) U/L 11         Cardiac:      Liver and pancreatic function:  Results from last 7 days   Lab Units 11/07/23  0701   ALBUMIN g/dL 4.2   BILIRUBIN mg/dL 0.4   ALK PHOS U/L 87   AST (SGOT) U/L 15   ALT (SGPT) U/L 11       Medications :     atorvastatin, 10 mg, Oral, Daily  calcitriol, 0.25 mcg, Oral, Once per day on Mon Wed Fri  [START ON 11/12/2023] cloNIDine, 1 patch, Transdermal, Weekly  heparin (porcine), 5,000 Units, Subcutaneous, Q8H  hydrALAZINE, 50 mg, Oral, TID  insulin glargine, 10 Units, Subcutaneous, Nightly  insulin lispro, 2-7 Units, Subcutaneous, 4x Daily AC & at Bedtime  pantoprazole, 40 mg, Oral, Q AM  senna-docusate sodium, 2 tablet, Oral, BID  sodium chloride, 10 mL, Intravenous, Q12H  tamsulosin, 0.4 mg, Oral, Daily      sodium bicarbonate 8.4 % 75 mEq in sodium chloride 0.45 % 1,000 mL infusion (greater than 75 mEq), 75 mEq, Last Rate: 150 mL/hr at 11/09/23 0245          Assessment & Plan     - JANNETH, nonoliguric  - Chronic kidney disease stage III  - Normal anion gap metabolic acidosis  - Hyperkalemia  - Essential hypertension, uncontrolled  - Type 2 diabetes mellitus     Creatinine is slightly worse today 2.8 from 2.5 likely working diagnosis is more ATN than prerenal  potassium is upper limit normal will give 1 dose of Lokelma today  No overt fluid overload clinically will continue on balanced bicarb fluid for now.  We will do a bladder scan to rule out urinary retention.  Educated and counseled the patient will continue  monitoring inpatient for now    JANNETH is most likely due to prerenal azotemia and hyperkalemia is likely due to ACE inhibitors.  Other differentials include impaired potassium excretion in settings of poor distal sodium delivery due to prerenal state.  Baseline creatinine appears to be 1.5, admitted with 3.  No hematuria, no proteinuria  No obstruction on renal ultrasound  - Continue hydralazine 50 mg 3 times daily  - Continue on balanced bicarb fluid for now  - Continue holding lisinopril  - Educated and counseled the patient for low K diet      Jean Claude Blankenship MD  11/09/23  07:24 EST

## 2023-11-09 NOTE — PLAN OF CARE
Goal Outcome Evaluation:      Pt has been resting this shift. Magnesium was replaced this shift. No signs and symptoms of acute distress noted. No complaints of chest pain or SOB reported.

## 2023-11-10 VITALS
OXYGEN SATURATION: 96 % | DIASTOLIC BLOOD PRESSURE: 66 MMHG | RESPIRATION RATE: 20 BRPM | HEART RATE: 74 BPM | BODY MASS INDEX: 40.04 KG/M2 | TEMPERATURE: 98.6 F | SYSTOLIC BLOOD PRESSURE: 155 MMHG | HEIGHT: 70 IN | WEIGHT: 279.7 LBS

## 2023-11-10 LAB
ANION GAP SERPL CALCULATED.3IONS-SCNC: 7.4 MMOL/L (ref 5–15)
BUN SERPL-MCNC: 40 MG/DL (ref 8–23)
BUN/CREAT SERPL: 13.8 (ref 7–25)
CALCIUM SPEC-SCNC: 8.6 MG/DL (ref 8.6–10.5)
CHLORIDE SERPL-SCNC: 106 MMOL/L (ref 98–107)
CO2 SERPL-SCNC: 25.6 MMOL/L (ref 22–29)
CREAT SERPL-MCNC: 2.9 MG/DL (ref 0.76–1.27)
DEPRECATED RDW RBC AUTO: 44.4 FL (ref 37–54)
EGFRCR SERPLBLD CKD-EPI 2021: 23.1 ML/MIN/1.73
ERYTHROCYTE [DISTWIDTH] IN BLOOD BY AUTOMATED COUNT: 12.1 % (ref 12.3–15.4)
GLUCOSE BLDC GLUCOMTR-MCNC: 106 MG/DL (ref 70–130)
GLUCOSE BLDC GLUCOMTR-MCNC: 135 MG/DL (ref 70–130)
GLUCOSE SERPL-MCNC: 111 MG/DL (ref 65–99)
HCT VFR BLD AUTO: 31.6 % (ref 37.5–51)
HGB BLD-MCNC: 9.8 G/DL (ref 13–17.7)
MAGNESIUM SERPL-MCNC: 1.3 MG/DL (ref 1.6–2.4)
MCH RBC QN AUTO: 30.6 PG (ref 26.6–33)
MCHC RBC AUTO-ENTMCNC: 31 G/DL (ref 31.5–35.7)
MCV RBC AUTO: 98.8 FL (ref 79–97)
PLATELET # BLD AUTO: 215 10*3/MM3 (ref 140–450)
PMV BLD AUTO: 10.1 FL (ref 6–12)
POTASSIUM SERPL-SCNC: 5 MMOL/L (ref 3.5–5.2)
RBC # BLD AUTO: 3.2 10*6/MM3 (ref 4.14–5.8)
SODIUM SERPL-SCNC: 139 MMOL/L (ref 136–145)
WBC NRBC COR # BLD: 8.97 10*3/MM3 (ref 3.4–10.8)

## 2023-11-10 PROCEDURE — 82948 REAGENT STRIP/BLOOD GLUCOSE: CPT

## 2023-11-10 PROCEDURE — 25010000002 HEPARIN (PORCINE) PER 1000 UNITS: Performed by: INTERNAL MEDICINE

## 2023-11-10 PROCEDURE — 85027 COMPLETE CBC AUTOMATED: CPT | Performed by: INTERNAL MEDICINE

## 2023-11-10 PROCEDURE — 25010000002 HYDRALAZINE PER 20 MG

## 2023-11-10 PROCEDURE — 99239 HOSP IP/OBS DSCHRG MGMT >30: CPT

## 2023-11-10 PROCEDURE — 80048 BASIC METABOLIC PNL TOTAL CA: CPT | Performed by: INTERNAL MEDICINE

## 2023-11-10 PROCEDURE — 83735 ASSAY OF MAGNESIUM: CPT

## 2023-11-10 PROCEDURE — 25010000002 MAGNESIUM SULFATE IN D5W 1G/100ML (PREMIX) 1-5 GM/100ML-% SOLUTION

## 2023-11-10 RX ORDER — HYDRALAZINE HYDROCHLORIDE 20 MG/ML
10 INJECTION INTRAMUSCULAR; INTRAVENOUS EVERY 6 HOURS PRN
Status: DISCONTINUED | OUTPATIENT
Start: 2023-11-10 | End: 2023-11-10 | Stop reason: HOSPADM

## 2023-11-10 RX ORDER — MAGNESIUM SULFATE 1 G/100ML
1 INJECTION INTRAVENOUS ONCE
Qty: 100 ML | Refills: 0 | Status: COMPLETED | OUTPATIENT
Start: 2023-11-10 | End: 2023-11-10

## 2023-11-10 RX ORDER — HYDRALAZINE HYDROCHLORIDE 25 MG/1
75 TABLET, FILM COATED ORAL 3 TIMES DAILY
Qty: 270 TABLET | Refills: 0 | Status: SHIPPED | OUTPATIENT
Start: 2023-11-10

## 2023-11-10 RX ADMIN — MAGNESIUM SULFATE HEPTAHYDRATE 1 G: 1 INJECTION, SOLUTION INTRAVENOUS at 05:39

## 2023-11-10 RX ADMIN — Medication 10 ML: at 09:02

## 2023-11-10 RX ADMIN — ATORVASTATIN CALCIUM 10 MG: 10 TABLET, FILM COATED ORAL at 09:02

## 2023-11-10 RX ADMIN — TAMSULOSIN HYDROCHLORIDE 0.4 MG: 0.4 CAPSULE ORAL at 09:02

## 2023-11-10 RX ADMIN — HYDRALAZINE HYDROCHLORIDE 10 MG: 20 INJECTION INTRAMUSCULAR; INTRAVENOUS at 03:47

## 2023-11-10 RX ADMIN — HYDRALAZINE HYDROCHLORIDE 75 MG: 50 TABLET, FILM COATED ORAL at 09:02

## 2023-11-10 RX ADMIN — HEPARIN SODIUM 5000 UNITS: 5000 INJECTION INTRAVENOUS; SUBCUTANEOUS at 05:39

## 2023-11-10 RX ADMIN — CALCITRIOL 0.25 MCG: 0.25 CAPSULE ORAL at 09:02

## 2023-11-10 RX ADMIN — PANTOPRAZOLE SODIUM 40 MG: 40 TABLET, DELAYED RELEASE ORAL at 05:39

## 2023-11-10 NOTE — PAYOR COMM NOTE
"[facility]  NPI:[id number]    Utilization Review  Contact: Ashleigh Mccrary RN  Phone: [phone]  Fax:[phone]    INITIATE INPATIENT AUTHORIZATION   Please note observation 11/7/2023 conversion to inpatient on 11/9/2023      Moe Smith (68 y.o. Male)       Date of Birth   1955    Social Security Number       Address   [address] [place] [address]    Home Phone   [phone]    MRN   [id number]       [place]   None    Marital Status   Single                            Admission Date   11/7/23    Admission Type   Emergency    Admitting Provider   Toy Catalan MD    Attending Provider   Toy Catalan MD    Department, Room/Bed   [address], 3304/2S       Discharge Date       Discharge Disposition       Discharge Destination                                 Attending Provider: Toy Catalan MD    Allergies: Sulfa Antibiotics    Isolation: None   Infection: None   Code Status: CPR    Ht: 177.8 cm (70\")   Wt: 127 kg (279 lb 11.2 oz)    Admission Cmt: None   Principal Problem: JANNETH (acute kidney injury) [N17.9]                   Active Insurance as of 11/7/2023       Primary Coverage       Payor Plan Insurance Group Employer/Plan Group    ANTHEM MEDICARE REPLACEMENT ANTHEM MEDICARE ADVANTAGE [id number]       Payor Plan Address Payor Plan Phone Number Payor Plan Fax Number Effective Dates    [address] [phone]  10/1/2023 - None Entered    [facility] [id number]         Subscriber Name Subscriber Birth Date Member ID       MOE SMITH 1955 [id number]                     Emergency Contacts        (Rel.) Home Phone Work Phone Mobile Phone    Jeyson Smith (Brother) [phone]                 History & Physical        Jelly Dejesus APRN at 11/07/23 1351       Attestation signed by Toy Catalan MD at 11/07/23 1651    I have evaluated the patient independently, I have reviewed H&P, all labs " and images from today and evaluated the patient at bedside.  I have discussed with NIKI Dejesus and agree.  Continue mIVFs, consult Nephrology, monitor on telemetry, hold ACEI, follow up labs in AM.                     [facility]  History & Physical    Patient Identification:  Name:  Moe Smith  Age:  68 y.o.  Sex:  male  :  1955  MRN:  [id number]   Visit Number:  [id number]  Admit Date: 2023   Primary Care Physician:  Mendoza Sharma MD    Subjective     Chief complaint: Abnormal lab    History of presenting illness:      Moe Smith is a 68 y.o. male with past medical history significant for CKD stage IIIa, insulin-dependent type 2 diabetes mellitus, essential hypertension, hyperlipidemia, and obesity by BMI.  Patient presents to  emergency department after being directed to do so due to elevated potassium level. Patient states that he follows with Nephrology in the outpatient setting and had labs drawn per order by Dr. Muller on 2023. He states that he received notification of critical lab via telephone this morning around 05:30 a.m. Patient states that he has had issues with hyperkalemia in the past. Previously on losartan due to ACE-I intolerance however his potassium became elevated therefore he was placed back on Lisinopril. States that he has been taking Lisinopril BID; last dose was yesterday evening. He denies any lower extremity edema, palpitations, chest pain, shortness of breath, or any other acute complaints at this time. States that he has been eating and drinking normally; blood glucose levels have been running low and he admits that he has been eating peanut butter and crackers frequently to increase his glucose levels. He also states that he bought a gallon of orange juice yesterday. He has only been taking 10 units of his night time insulin instead of previously prescribed 20 units long acting insulin.  Plan to hold Lisinopril on admission. Patient was placed on balanced bicarb gtt. Nephrology consulted for assistance as patient's renal function has worsened as well and he meets criteria for JANNETH. Creatinine on arrival 3.0; baseline appears to be ~1.2-1.5. Discussed plans with patient. He voiced agreement and understanding with no further questions or concerns at this time.     Upon arrival to the ED, vital signs were temperature 97, pulse 67, respirations 12, blood pressure 187/93, SPO2 saturation 100% on room air.  CMP with potassium 5.8, chloride 110, CO2 20.4, BUN 61, creatinine 3.03, EGFR 21.9, magnesium 1.3, otherwise unremarkable.  CBC with RBCs 3.86, hemoglobin 11.8, .0, MCHC 30.3, otherwise unremarkable.  Urinalysis with 2+ glucose, otherwise unremarkable.  CT of the abdomen and pelvis without contrast noted degenerative changes in the lumbar spine, otherwise no acute abnormality.  Renal ultrasound noted 3.7 cm simple cyst of the right kidney.  Otherwise bilateral renal ultrasound negative.    Known Emergency Department medications received prior to my evaluation included 25 mg oral hydralazine, 30 g lactulose, 2 g IV magnesium sulfate, 30 mg nifedipine, 1 L normal saline bolus x2, 10 g packet of Lokelma x2, sodium bicarb infusion. Room location at the time of my evaluation was 304B.  Discussed with admitting physician, Toy Krause MD.    ---------------------------------------------------------------------------------------------------------------------   Review of Systems   Constitutional:  Negative for chills, fatigue and fever.   HENT:  Negative for congestion, sore throat and trouble swallowing.    Respiratory:  Negative for cough, shortness of breath and wheezing.    Cardiovascular:  Negative for chest pain, palpitations and leg swelling.   Gastrointestinal:  Negative for abdominal pain, constipation, diarrhea, nausea and vomiting.   Genitourinary:  Negative for difficulty urinating,  flank pain, frequency and urgency.   Musculoskeletal:  Negative for back pain, gait problem, neck pain and neck stiffness.   Neurological:  Negative for dizziness, tremors, seizures, syncope, facial asymmetry, speech difficulty, weakness, light-headedness, numbness and headaches.     ---------------------------------------------------------------------------------------------------------------------   No past medical history on file.  No past surgical history on file.  No family history on file.  Social History     Socioeconomic History    Marital status: Single     ---------------------------------------------------------------------------------------------------------------------   Allergies:  Sulfa antibiotics  ---------------------------------------------------------------------------------------------------------------------   Home medications:    Medications below are reported home medications pulling from within the system; at this time, these medications have not been reconciled unless otherwise specified and are in the verification process for further verifcation as current home medications.  (Not in a hospital admission)      Hospital Scheduled Meds:     sodium bicarbonate 8.4 % 75 mEq in sodium chloride 0.45 % 1,000 mL infusion (greater than 75 mEq), 75 mEq, Last Rate: 75 mEq (11/07/23 1344)        Current listed hospital scheduled medications may not yet reflect those currently placed in orders that are signed and held awaiting patient's arrival to floor.   ---------------------------------------------------------------------------------------------------------------------     Objective     Vital Signs:  Temp:  [97 °F (36.1 °C)] 97 °F (36.1 °C)  Heart Rate:  [61-85] 85  Resp:  [12] 12  BP: (147-192)/() 162/77      11/07/23  0642   Weight: 124 kg (273 lb)     Body mass index is 38.08  kg/m².  ---------------------------------------------------------------------------------------------------------------------       Physical Exam  Vitals reviewed.   Constitutional:       General: He is awake. He is not in acute distress.     Appearance: He is obese. He is not ill-appearing or diaphoretic.      Comments: Room air.    HENT:      Head: Normocephalic and atraumatic.      Mouth/Throat:      Mouth: Mucous membranes are moist.      Pharynx: Oropharynx is clear.   Eyes:      Extraocular Movements: Extraocular movements intact.      Pupils: Pupils are equal, round, and reactive to light.   Cardiovascular:      Rate and Rhythm: Normal rate and regular rhythm.      Pulses: Normal pulses.           Dorsalis pedis pulses are 2+ on the right side and 2+ on the left side.      Heart sounds: Normal heart sounds. No murmur heard.     No friction rub.   Pulmonary:      Effort: Pulmonary effort is normal. No accessory muscle usage, respiratory distress or retractions.      Breath sounds: No wheezing, rhonchi or rales.   Abdominal:      General: Bowel sounds are normal. There is no distension.      Palpations: Abdomen is soft.      Tenderness: There is no abdominal tenderness. There is no guarding.   Musculoskeletal:      Cervical back: Neck supple. No rigidity.      Right lower leg: No edema.      Left lower leg: No edema.   Skin:     General: Skin is warm and dry.      Capillary Refill: Capillary refill takes 2 to 3 seconds.   Neurological:      Mental Status: He is alert and oriented to person, place, and time. Mental status is at baseline.      Cranial Nerves: No dysarthria or facial asymmetry.      Sensory: Sensation is intact.      Motor: No weakness or tremor.   Psychiatric:         Attention and Perception: Attention normal.         Mood and Affect: Mood normal.         Speech: Speech normal.         Behavior: Behavior normal. Behavior is cooperative.         Thought Content: Thought content normal.          "Cognition and Memory: Cognition normal.         Judgment: Judgment normal.       ---------------------------------------------------------------------------------------------------------------------  EKG:  Pending formal cardiology interpretation. Per my review, appears normal sinus rhythm 70s without evidence of acute ischemia. Mildly peaked T waves.      Telemetry:  Appearing normal sinus rhythm 70s during my exam.   I have personally looked at both the EKG and the telemetry strips.  ---------------------------------------------------------------------------------------------------------------------   Results from last 7 days   Lab Units 11/07/23  0701   WBC 10*3/mm3 8.40   HEMOGLOBIN g/dL 11.8*   HEMATOCRIT % 39.0   MCV fL 101.0*   MCHC g/dL 30.3*   PLATELETS 10*3/mm3 244         Results from last 7 days   Lab Units 11/07/23  1203 11/07/23  0701   SODIUM mmol/L 140 140   POTASSIUM mmol/L 5.9* 5.8*   MAGNESIUM mg/dL  --  1.3*   CHLORIDE mmol/L 115* 110*   CO2 mmol/L 16.1* 20.4*   BUN mg/dL 57* 61*   CREATININE mg/dL 2.69* 3.03*   CALCIUM mg/dL 8.5* 9.2   GLUCOSE mg/dL 86 82   ALBUMIN g/dL  --  4.2   BILIRUBIN mg/dL  --  0.4   ALK PHOS U/L  --  87   AST (SGOT) U/L  --  15   ALT (SGPT) U/L  --  11   Estimated Creatinine Clearance: 36.2 mL/min (A) (by C-G formula based on SCr of 2.69 mg/dL (H)).  No results found for: \"AMMONIA\"  Results from last 7 days   Lab Units 11/07/23  0701   CK TOTAL U/L 83         Lab Results   Component Value Date    HGBA1C 6.7 (H) 07/11/2015     No results found for: \"TSH\", \"FREET4\"  No results found for: \"PREGTESTUR\", \"PREGSERUM\", \"HCG\", \"HCGQUANT\"  Pain Management Panel  More data exists         Latest Ref Rng & Units 9/3/2016 8/30/2015   Pain Management Panel   Creatinine, Urine mg/dL 135.8  104.2          ---------------------------------------------------------------------------------------------------------------------  Imaging Results (Last 7 Days)       Procedure Component Value " Units Date/Time    CT Abdomen Pelvis Without Contrast [[id number]] Collected: 11/07/23 0820     Updated: 11/07/23 0822    Narrative:      EXAM:    CT Abdomen and Pelvis Without Intravenous Contrast     EXAM DATE:    11/7/2023 8:51 AM     CLINICAL HISTORY:    JANNETH     TECHNIQUE:    Axial computed tomography images of the abdomen and pelvis without  intravenous contrast.  Sagittal and coronal reformatted images were  created and reviewed.  This CT exam was performed using one or more of  the following dose reduction techniques:  automated exposure control,  adjustment of the mA and/or kV according to patient size, and/or use of  iterative reconstruction technique.     COMPARISON:    6/14/2023     FINDINGS:    LUNG BASES:  Unremarkable as visualized.  No mass.  No consolidation.      ABDOMEN:    LIVER:  Unremarkable as visualized.    GALLBLADDER AND BILE DUCTS:  Unremarkable as visualized.  No calcified  stones.  No ductal dilation.    PANCREAS:  Unremarkable as visualized.  No ductal dilation.    SPLEEN:  Unremarkable as visualized.  No splenomegaly.    ADRENALS:  Unremarkable as visualized.  No mass.    KIDNEYS AND URETERS:  Unremarkable as visualized.  No obstructing  stones.  No hydronephrosis.    STOMACH AND BOWEL:  Scattered diverticula in the colon.  No  diverticulitis.  No obstruction.      PELVIS:    APPENDIX:  No findings to suggest acute appendicitis.    BLADDER:  Unremarkable as visualized.  No stones.    REPRODUCTIVE:  Unremarkable as visualized.      ABDOMEN and PELVIS:    INTRAPERITONEAL SPACE:  Unremarkable as visualized.  No free air.  No  significant fluid collection.    BONES/JOINTS:  Degenerative disc disease throughout the lumbar spine.   Degenerative facet arthropathy throughout the lumbar spine, most  prominent in the lower lumbar spine.  No acute fracture.  No  dislocation.    SOFT TISSUES:  Unremarkable as visualized.    VASCULATURE:  Atherosclerotic disease.  No abdominal aortic aneurysm.     LYMPH NODES:  Unremarkable as visualized.  No enlarged lymph nodes.       Impression:        Degenerative changes lumbar spine as described.        This report was finalized on 11/7/2023 8:20 AM by Dr. Raul Buchanan MD.               Last echocardiogram: No previous echo on file.      I have personally reviewed the above radiology images and read the final radiology report on 11/07/23  ---------------------------------------------------------------------------------------------------------------------  Assessment / Plan     Active Hospital Problems    Diagnosis  POA    **JANNETH (acute kidney injury) [N17.9]  Yes       ASSESSMENT/PLAN:  -JANNETH on CKD stage IIIa, present on admission  -Anion gap metabolic acidosis due to above  -Acute hypomagnesemia and acute hyperkalemia, present on admission  -Chronic macrocytic anemia, stable  -Hypertension, uncontrolled  -Insulin-dependent type II DM  -Obesity by BMI, 39.89 kg/m2  -Baseline creatinine ~1.2-1.5 with creatinine on admission of 3.0.  -CT of the abdomen and pelvis without contrast obtained without evidence of urinary obstruction or hydronephrosis.  -Bilateral renal ultrasound noted 3.7 cm cyst of the right kidney, otherwise unremarkable.  -Monitor urine output and renal function closely.  -Avoid nephrotoxins as able.  -Avoid hypotension.  -Patient hypertensive on arrival to ED; BP improved after administration of oral hydralazine and Nifedipine.   -Continue to closely monitor VS per hospital policy.  -Review home antihypertensive agents once reconciled by pharmacy; plan to continue with holding parameters to prevent hypotension and/or bradycardia. Hold any medication which may have contributed to JANNETH.   -Placed on balanced bicarb gtt on admission @ 150 ml/hr.  -Received Lokelma x2 and magnesium replacement so far.   -Continue to monitor electrolytes and replace as necessary.   -Continuous cardiac monitoring ordered.  -Repeat chemistry panel in AM.  -Inpatient Nephrology  consult placed for further assistance. Greatly appreciate their input.   -Obtain HgbA1C.  -Review home medication regimen once reconciled per pharmacy.   -Hold any home oral DM medications for now.  -Start low dose SSI, titrate dosage as necessary.  -Closely monitor blood glucose levels with accuchecks AC and HS.  -Hypoglycemia protocol in place.       ----------  -DVT prophylaxis: SubQ Heparin.  -Activity: As tolerated.   -Expected length of stay:   OBSERVATION status; however, if further evaluation or treatment plans warrant, status may change.  Based upon current information, I predict patient's care encounter to be less than or equal to 2 midnights   -Disposition plans to return home on discharge once medically stable.    High risk secondary to JANNETH on CKD stage IIIa, anion gap metabolic acidosis, acute hypomagnesemia, acute hyperkalemia.       CODE STATUS: FULL CODE      Jelly DejesusNIKI   11/07/23  13:51 EST  Pager #[phone]  ---------------------------------------------------------------------------------------------------------------------        Electronically signed by Toy Catalan MD at 11/07/23 1651          Emergency Department Notes        Lizzeth Deng RN at 11/07/23 1322          Awaiting medication from pharmacy at this time    Electronically signed by Lizzeth Deng RN at 11/07/23 1322       Lety Pastrana PA at 11/07/23 4480          Subjective   History of Present Illness  68-year-old male presents to the ED with chief complaint of abnormal lab.  Patient verbalized at outpatient lab he had a metabolic panel ran yesterday.  Patient was notified that his potassium was 6.9.  Patient does have stage III kidney disease in which she is under the care of nephrologist Dr. Montero.  Patient states the only medication that he has started over the last few weeks has been Flomax.  Patient did stop his angiotensin receptor blocker.        Review of Systems   Constitutional: Negative.  Negative for  fever.   HENT: Negative.     Respiratory: Negative.     Cardiovascular: Negative.  Negative for chest pain.   Gastrointestinal: Negative.  Negative for abdominal pain.   Endocrine: Negative.    Genitourinary: Negative.  Negative for dysuria.   Skin: Negative.    Neurological: Negative.    Psychiatric/Behavioral: Negative.     All other systems reviewed and are negative.      No past medical history on file.    Allergies   Allergen Reactions    Sulfa Antibiotics Hives       No past surgical history on file.    No family history on file.    Social History     Socioeconomic History    Marital status: Single           Objective   Physical Exam  Vitals and nursing note reviewed.   Constitutional:       General: He is not in acute distress.     Appearance: He is well-developed. He is not diaphoretic.   HENT:      Head: Normocephalic and atraumatic.      Right Ear: External ear normal.      Left Ear: External ear normal.      Nose: Nose normal.   Eyes:      Conjunctiva/sclera: Conjunctivae normal.   Neck:      Vascular: No JVD.      Trachea: No tracheal deviation.   Cardiovascular:      Rate and Rhythm: Normal rate and regular rhythm.      Heart sounds: Normal heart sounds. No murmur heard.  Pulmonary:      Effort: Pulmonary effort is normal. No respiratory distress.      Breath sounds: Normal breath sounds. No wheezing.   Abdominal:      Palpations: Abdomen is soft.      Tenderness: There is no abdominal tenderness.   Musculoskeletal:         General: No deformity. Normal range of motion.      Cervical back: Normal range of motion and neck supple.   Skin:     General: Skin is warm and dry.      Coloration: Skin is not pale.      Findings: No erythema or rash.   Neurological:      Mental Status: He is alert and oriented to person, place, and time.      Cranial Nerves: No cranial nerve deficit.   Psychiatric:         Behavior: Behavior normal.         Thought Content: Thought content normal.         Procedures      Results  for orders placed or performed during the hospital encounter of 11/07/23   Comprehensive Metabolic Panel    Specimen: Arm, Right; Blood   Result Value Ref Range    Glucose 82 65 - 99 mg/dL    BUN 61 (H) 8 - 23 mg/dL    Creatinine 3.03 (H) 0.76 - 1.27 mg/dL    Sodium 140 136 - 145 mmol/L    Potassium 5.8 (H) 3.5 - 5.2 mmol/L    Chloride 110 (H) 98 - 107 mmol/L    CO2 20.4 (L) 22.0 - 29.0 mmol/L    Calcium 9.2 8.6 - 10.5 mg/dL    Total Protein 7.0 6.0 - 8.5 g/dL    Albumin 4.2 3.5 - 5.2 g/dL    ALT (SGPT) 11 1 - 41 U/L    AST (SGOT) 15 1 - 40 U/L    Alkaline Phosphatase 87 39 - 117 U/L    Total Bilirubin 0.4 0.0 - 1.2 mg/dL    Globulin 2.8 gm/dL    A/G Ratio 1.5 g/dL    BUN/Creatinine Ratio 20.1 7.0 - 25.0    Anion Gap 9.6 5.0 - 15.0 mmol/L    eGFR 21.9 (L) >60.0 mL/min/1.73   Urinalysis With Microscopic If Indicated (No Culture) - Urine, Clean Catch    Specimen: Urine, Clean Catch   Result Value Ref Range    Color, UA Yellow Yellow, Straw    Appearance, UA Clear Clear    pH, UA <=5.0 5.0 - 8.0    Specific Gravity, UA 1.015 1.005 - 1.030    Glucose,  mg/dL (2+) (A) Negative    Ketones, UA Negative Negative    Bilirubin, UA Negative Negative    Blood, UA Negative Negative    Protein, UA Negative Negative    Leuk Esterase, UA Negative Negative    Nitrite, UA Negative Negative    Urobilinogen, UA 0.2 E.U./dL 0.2 - 1.0 E.U./dL   Magnesium    Specimen: Arm, Right; Blood   Result Value Ref Range    Magnesium 1.3 (L) 1.6 - 2.4 mg/dL   CBC Auto Differential    Specimen: Arm, Right; Blood   Result Value Ref Range    WBC 8.40 3.40 - 10.80 10*3/mm3    RBC 3.86 (L) 4.14 - 5.80 10*6/mm3    Hemoglobin 11.8 (L) 13.0 - 17.7 g/dL    Hematocrit 39.0 37.5 - 51.0 %    .0 (H) 79.0 - 97.0 fL    MCH 30.6 26.6 - 33.0 pg    MCHC 30.3 (L) 31.5 - 35.7 g/dL    RDW 12.6 12.3 - 15.4 %    RDW-SD 47.0 37.0 - 54.0 fl    MPV 9.9 6.0 - 12.0 fL    Platelets 244 140 - 450 10*3/mm3    Neutrophil % 61.3 42.7 - 76.0 %    Lymphocyte % 27.3 19.6  - 45.3 %    Monocyte % 7.0 5.0 - 12.0 %    Eosinophil % 3.2 0.3 - 6.2 %    Basophil % 0.7 0.0 - 1.5 %    Immature Grans % 0.5 0.0 - 0.5 %    Neutrophils, Absolute 5.15 1.70 - 7.00 10*3/mm3    Lymphocytes, Absolute 2.29 0.70 - 3.10 10*3/mm3    Monocytes, Absolute 0.59 0.10 - 0.90 10*3/mm3    Eosinophils, Absolute 0.27 0.00 - 0.40 10*3/mm3    Basophils, Absolute 0.06 0.00 - 0.20 10*3/mm3    Immature Grans, Absolute 0.04 0.00 - 0.05 10*3/mm3    nRBC 0.0 0.0 - 0.2 /100 WBC   CK    Specimen: Arm, Right; Blood   Result Value Ref Range    Creatine Kinase 83 20 - 200 U/L   Basic Metabolic Panel    Specimen: Hand, Right; Blood   Result Value Ref Range    Glucose 86 65 - 99 mg/dL    BUN 57 (H) 8 - 23 mg/dL    Creatinine 2.69 (H) 0.76 - 1.27 mg/dL    Sodium 140 136 - 145 mmol/L    Potassium 5.9 (H) 3.5 - 5.2 mmol/L    Chloride 115 (H) 98 - 107 mmol/L    CO2 16.1 (L) 22.0 - 29.0 mmol/L    Calcium 8.5 (L) 8.6 - 10.5 mg/dL    BUN/Creatinine Ratio 21.2 7.0 - 25.0    Anion Gap 8.9 5.0 - 15.0 mmol/L    eGFR 25.3 (L) >60.0 mL/min/1.73   Green Top (Gel)   Result Value Ref Range    Extra Tube Hold for add-ons.    Lavender Top   Result Value Ref Range    Extra Tube hold for add-on    Gold Top - SST   Result Value Ref Range    Extra Tube Hold for add-ons.    Light Blue Top   Result Value Ref Range    Extra Tube Hold for add-ons.           ED Course  ED Course as of 11/07/23 1319   Tue Nov 07, 2023   0757 ECG 12 Lead Electrolyte Imbalance  NSR< rate 71, , no acute ST or T wave changes [CW]   0741 Discussed with nephrology Dr. Montero who is the patient's nephrologist.  Creatinine of 3 is high for this patient.  Given the fact that the patient does also have findings of hyperkalemia states that the patient may have to come in for JANNETH. [RB]   0830 CT Abdomen Pelvis Without Contrast  FINDINGS:    LUNG BASES:  Unremarkable as visualized.  No mass.  No consolidation.      ABDOMEN:    LIVER:  Unremarkable as visualized.    GALLBLADDER AND  BILE DUCTS:  Unremarkable as visualized.  No calcified  stones.  No ductal dilation.    PANCREAS:  Unremarkable as visualized.  No ductal dilation.    SPLEEN:  Unremarkable as visualized.  No splenomegaly.    ADRENALS:  Unremarkable as visualized.  No mass.    KIDNEYS AND URETERS:  Unremarkable as visualized.  No obstructing  stones.  No hydronephrosis.    STOMACH AND BOWEL:  Scattered diverticula in the colon.  No  diverticulitis.  No obstruction.      PELVIS:    APPENDIX:  No findings to suggest acute appendicitis.    BLADDER:  Unremarkable as visualized.  No stones.    REPRODUCTIVE:  Unremarkable as visualized.      ABDOMEN and PELVIS:    INTRAPERITONEAL SPACE:  Unremarkable as visualized.  No free air.  No  significant fluid collection.    BONES/JOINTS:  Degenerative disc disease throughout the lumbar spine.   Degenerative facet arthropathy throughout the lumbar spine, most  prominent in the lower lumbar spine.  No acute fracture.  No  dislocation.    SOFT TISSUES:  Unremarkable as visualized.    VASCULATURE:  Atherosclerotic disease.  No abdominal aortic aneurysm.    LYMPH NODES:  Unremarkable as visualized.  No enlarged lymph nodes.     IMPRESSION:    Degenerative changes lumbar spine as described.   []   0920 Contacted nephrology office for most recent labs - on 9/18/23 patient had BUN of 41 and creatinine of 1.82 []   1315 Dr. Catalan to admit []      ED Course User Index  [AH] Lety Pastrana PA  [CW] Nicolas Manjarrez DO  [RB] Carson Azul II, PA                                           Medical Decision Making  68-year-old male who presents to the ED today for hyperkalemia.  He states he received a phone call that his potassium was 6.9 on a recent lab check.  He does have a history of stage III chronic kidney disease.  On his last check his creatinine was 1.82.  This was in September.  His initial creatinine today was 3 with a potassium of 5.8.  He was given Lokelma as well as 2 L of normal  saline.  Recheck BMP showed that his creatinine went down to 2.6 however his potassium remained at 5.9.  Therefore decision was made for the need for admission.  Patient is noted to be on lisinopril 20 mg for blood pressure control.  This is most likely a contributing factor to his worsening renal disease.  This was held while he was in the ED and he was given nifedipine and hydralazine for his hypertension.  I spoke with Dr. Catalan with our hospitalist service team and the patient will be admitted.    Problems Addressed:  JANNETH (acute kidney injury): complicated acute illness or injury  Hyperkalemia: complicated acute illness or injury    Amount and/or Complexity of Data Reviewed  Labs: ordered.  Radiology: ordered. Decision-making details documented in ED Course.  ECG/medicine tests: ordered. Decision-making details documented in ED Course.    Risk  Prescription drug management.  Decision regarding hospitalization.        Final diagnoses:   JANNETH (acute kidney injury)   Hyperkalemia       ED Disposition  ED Disposition       ED Disposition   Decision to Admit    Condition   --    Comment   Level of Care: Telemetry [5]   Diagnosis: JANNETH (acute kidney injury) [658400]   Admitting Physician: MAXIMINO CATALAN [[id number]]   Attending Physician: MAXIMINO CATALAN [[id number]]                 No follow-up provider specified.       Medication List      No changes were made to your prescriptions during this visit.            Lety Pastrana PA  11/07/23 1319      Electronically signed by Lety Pastrana PA at 11/07/23 1319       Lizzeth Deng RN at 11/07/23 0702          Assessing patient at this time. Patient states that he was called by his PCP stating that his potassium was high and to come to the ER. Patient reports no complaints. Patient denies chest pain and SOA. Patient remains on cardiac monitor and vital sign monitoring. Left AC IV flushed and saline locked. Patient provided with urinal and explained that a urine sample is needed and to  notify ED staff when able to provide one.    Electronically signed by Lizzeth eDng RN at 11/07/23 0723       Stephen Fournier at 11/07/23 0646          EKG performed @ 6:43    Electronically signed by Stephen Fournier at 11/07/23 0646       Facility-Administered Medications as of 11/10/2023   Medication Dose Route Frequency Provider Last Rate Last Admin    acetaminophen (TYLENOL) tablet 650 mg  650 mg Oral Q6H PRN Dann Paniagua APRN   650 mg at 11/08/23 0244    atorvastatin (LIPITOR) tablet 10 mg  10 mg Oral Daily Toy Catalan MD   10 mg at 11/09/23 0931    sennosides-docusate (PERICOLACE) 8.6-50 MG per tablet 2 tablet  2 tablet Oral BID Toy Catalan MD        And    polyethylene glycol (MIRALAX) packet 17 g  17 g Oral Daily PRN Toy Catalan MD        And    bisacodyl (DULCOLAX) EC tablet 5 mg  5 mg Oral Daily PRN Toy Catalan MD        And    bisacodyl (DULCOLAX) suppository 10 mg  10 mg Rectal Daily PRN Toy Catalan MD        calcitriol (ROCALTROL) capsule 0.25 mcg  0.25 mcg Oral Once per day on Mon Wed Fri Toy Catalan MD   0.25 mcg at 11/08/23 0843    [START ON 11/12/2023] cloNIDine (CATAPRES-TTS) 0.3 MG/24HR patch 1 patch  1 patch Transdermal Weekly Toy Catalan MD        dextrose (D50W) (25 g/50 mL) IV injection 25 g  25 g Intravenous Q15 Min PRN Jelly Dejesus APRN        dextrose (GLUTOSE) oral gel 15 g  15 g Oral Q15 Min PRN Jelly Dejesus APRN        glucagon HCl (Diagnostic) injection 1 mg  1 mg Intramuscular Q15 Min PRN Jelly Dejesus APRN        heparin (porcine) 5000 UNIT/ML injection 5,000 Units  5,000 Units Subcutaneous Q8H Toy Catalan MD   5,000 Units at 11/10/23 0539    hydrALAZINE (APRESOLINE) injection 10 mg  10 mg Intravenous Q6H PRN Yuli Vargas PA-C   10 mg at 11/10/23 0347    [COMPLETED] hydrALAZINE (APRESOLINE) tablet 25 mg  25 mg Oral Once Nicolas Manjarrez DO   25 mg at 11/07/23 0949    hydrALAZINE (APRESOLINE) tablet 50 mg  50 mg Oral TID Jean Claude Blankenship MD   50 mg  at 11/09/23 2029    insulin glargine (LANTUS, SEMGLEE) injection 10 Units  10 Units Subcutaneous Nightly Toy Catalan MD   10 Units at 11/09/23 2030    Insulin Lispro (humaLOG) injection 2-7 Units  2-7 Units Subcutaneous 4x Daily AC & at Bedtime Jelly Dejesus APRN   2 Units at 11/09/23 2029    [COMPLETED] lactulose (CHRONULAC) 10 GM/15ML solution 30 g  30 g Oral Once Nicolas Manjarrez DO   30 g at 11/07/23 1336    [COMPLETED] magnesium sulfate 2g/50 mL (PREMIX) infusion  2 g Intravenous Once Carson Azul II, PA   Stopped at 11/07/23 0951    [COMPLETED] magnesium sulfate in D5W 1g/100mL (PREMIX)  1 g Intravenous Once Yuli Vargas PA-C   1 g at 11/09/23 0407    magnesium sulfate in D5W 1g/100mL (PREMIX)  1 g Intravenous Once Yuli Vargas PA-C   1 g at 11/10/23 0539    [COMPLETED] NIFEdipine CC (ADALAT CC) 24 hr tablet 30 mg  30 mg Oral Once Nicolas Manjarrez DO   30 mg at 11/07/23 0950    [COMPLETED] NIFEdipine CC (ADALAT CC) 24 hr tablet 30 mg  30 mg Oral Once Nicolas Manjarrez DO   30 mg at 11/07/23 1143    ondansetron (ZOFRAN) injection 4 mg  4 mg Intravenous Q6H PRN Sathish Alford PA-C   4 mg at 11/09/23 2331    pantoprazole (PROTONIX) EC tablet 40 mg  40 mg Oral Q AM Toy Catalan MD   40 mg at 11/10/23 0539    sodium bicarbonate 8.4 % 75 mEq in sodium chloride 0.45 % 1,000 mL infusion (greater than 75 mEq)  75 mEq Intravenous Continuous Toy Catalan  mL/hr at 11/09/23 0245 Currently Infusing at 11/09/23 0245    [COMPLETED] sodium chloride 0.9 % bolus 1,000 mL  1,000 mL Intravenous Once Lety Pastrana PA   Stopped at 11/07/23 1145    [COMPLETED] sodium chloride 0.9 % bolus 1,000 mL  1,000 mL Intravenous Once Lety Pastrana PA   Stopped at 11/07/23 1145    sodium chloride 0.9 % flush 10 mL  10 mL Intravenous PRN Toy Catalan MD        sodium chloride 0.9 % flush 10 mL  10 mL Intravenous Q12H Toy Catalan MD   10 mL at 11/09/23 2123    sodium chloride 0.9 %  flush 10 mL  10 mL Intravenous PRN Toy Catalan MD        sodium chloride 0.9 % infusion 40 mL  40 mL Intravenous PRN Toy Catalan MD        [COMPLETED] sodium zirconium cyclosilicate (LOKELMA) pack 10 g  10 g Oral Once Carson Azul II, PA   10 g at 23 0806    [COMPLETED] sodium zirconium cyclosilicate (LOKELMA) pack 10 g  10 g Oral Once Nicolas Manjarrez DO   10 g at 23 1336    [COMPLETED] sodium zirconium cyclosilicate (LOKELMA) pack 10 g  10 g Oral Once Jean Claude Blaneknship MD   10 g at 23 0931    tamsulosin (FLOMAX) 24 hr capsule 0.4 mg  0.4 mg Oral Daily Toy Catalan MD   0.4 mg at 23 0931        Physician Progress Notes (all)        Sathish Alford PA-C at 23 0952       Attestation signed by Toy Catalan MD at 23 1424    I have reviewed this documentation and agree.  Continue IVFs, Nephrology consulted and following.                   Patient Identification:  Name:  Moe Smith  Age:  68 y.o.  Sex:  male  :  1955  MRN:  [id number]  Visit Number:  [id number]  Primary Care Provider:  Mendoza Sharma MD    Length of stay:  0    Subjective/Interval History/Consultants/Procedures     Chief Complaint:   Chief Complaint   Patient presents with    Abnormal Lab       Subjective/Interval History:    68 y.o. male who was admitted on 2023 with  JANNETH, hyperkalemia      PMH is significant for CKD stage IIIa, insulin-dependent type 2 diabetes mellitus, essential hypertension, hyperlipidemia, and obesity by BMI    For complete admission information, please see history and physical.     Consultations:  nephrology      Procedures/Scans:  CT abdomen pelvis without contrast     Today, the patient was sitting up on the edge of the bed in no distress. He reports he feels well. No chest pain, no palpitations. Denies decreased urine output. Does reports some intermittent nausea well controlled with PRN zofran.      Room location at the time of evaluation was  304b.    ----------------------------------------------------------------------------------------------------------------------  Current Hospital Meds:  atorvastatin, 10 mg, Oral, Daily  calcitriol, 0.25 mcg, Oral, Once per day on Mon Wed Fri  [START ON 11/12/2023] cloNIDine, 1 patch, Transdermal, Weekly  heparin (porcine), 5,000 Units, Subcutaneous, Q8H  hydrALAZINE, 50 mg, Oral, TID  insulin glargine, 10 Units, Subcutaneous, Nightly  insulin lispro, 2-7 Units, Subcutaneous, 4x Daily AC & at Bedtime  pantoprazole, 40 mg, Oral, Q AM  senna-docusate sodium, 2 tablet, Oral, BID  sodium chloride, 10 mL, Intravenous, Q12H  tamsulosin, 0.4 mg, Oral, Daily      sodium bicarbonate 8.4 % 75 mEq in sodium chloride 0.45 % 1,000 mL infusion (greater than 75 mEq), 75 mEq, Last Rate: 150 mL/hr at 11/09/23 0245      ----------------------------------------------------------------------------------------------------------------------      Objective     Vital Signs:  Temp:  [98 °F (36.7 °C)-98.3 °F (36.8 °C)] 98 °F (36.7 °C)  Heart Rate:  [59-70] 59  Resp:  [16-22] 22  BP: (154-195)/(74-92) 175/86      11/07/23  1429 11/08/23  0545 11/09/23  8840   Weight: 126 kg (278 lb) 129 kg (283 lb 8 oz) 128 kg (282 lb 8 oz)     Body mass index is 40.53 kg/m².    Intake/Output Summary (Last 24 hours) at 11/9/2023 0954  Last data filed at 11/9/2023 0359  Gross per 24 hour   Intake 480 ml   Output 1125 ml   Net -645 ml     No intake/output data recorded.  Diet: Diabetic Diets, Renal Diets; Consistent Carbohydrate; Low Potassium; Texture: Regular Texture (IDDSI 7); Fluid Consistency: Thin (IDDSI 0)  ----------------------------------------------------------------------------------------------------------------------    Physical Exam  Vitals and nursing note reviewed.   Constitutional:       General: He is not in acute distress.  HENT:      Head: Normocephalic and atraumatic.   Eyes:      Extraocular Movements: Extraocular movements intact.       "Conjunctiva/sclera: Conjunctivae normal.   Cardiovascular:      Rate and Rhythm: Normal rate.   Pulmonary:      Effort: Pulmonary effort is normal.   Abdominal:      Palpations: Abdomen is soft.   Musculoskeletal:      Right lower leg: No edema.      Left lower leg: No edema.   Skin:     General: Skin is warm and dry.   Neurological:      Mental Status: He is alert. Mental status is at baseline.   Psychiatric:         Mood and Affect: Mood normal.         Behavior: Behavior normal.            ----------------------------------------------------------------------------------------------------------------------  Tele:        ----------------------------------------------------------------------------------------------------------------------  Results from last 7 days   Lab Units 11/07/23  0701   CK TOTAL U/L 83     Results from last 7 days   Lab Units 11/09/23  0045 11/08/23  0119 11/07/23  0701   WBC 10*3/mm3 8.53 7.14 8.40   HEMOGLOBIN g/dL 10.3* 10.1* 11.8*   HEMATOCRIT % 31.9* 32.1* 39.0   MCV fL 97.6* 98.5* 101.0*   MCHC g/dL 32.3 31.5 30.3*   PLATELETS 10*3/mm3 218 198 244         Results from last 7 days   Lab Units 11/09/23  0045 11/08/23  0119 11/07/23  1203 11/07/23  0701   SODIUM mmol/L 144 143 140 140   POTASSIUM mmol/L 5.2 5.0 5.9* 5.8*   MAGNESIUM mg/dL 1.4*  --   --  1.3*   CHLORIDE mmol/L 110* 113* 115* 110*   CO2 mmol/L 26.1 20.2* 16.1* 20.4*   BUN mg/dL 47* 52* 57* 61*   CREATININE mg/dL 2.83* 2.54* 2.69* 3.03*   CALCIUM mg/dL 8.6 8.4* 8.5* 9.2   GLUCOSE mg/dL 77 90 86 82   ALBUMIN g/dL  --   --   --  4.2   BILIRUBIN mg/dL  --   --   --  0.4   ALK PHOS U/L  --   --   --  87   AST (SGOT) U/L  --   --   --  15   ALT (SGPT) U/L  --   --   --  11   Estimated Creatinine Clearance: 34.5 mL/min (A) (by C-G formula based on SCr of 2.83 mg/dL (H)).  No results found for: \"AMMONIA\"      No results found for: \"BLOODCX\"  No results found for: \"URINECX\"  No results found for: \"WOUNDCX\"  No results found for: " "\"STOOLCX\"  ----------------------------------------------------------------------------------------------------------------------  Imaging Results (Last 24 Hours)       ** No results found for the last 24 hours. **          ----------------------------------------------------------------------------------------------------------------------   I have reviewed the above laboratory values for 11/09/23    Assessment/Plan     Active Hospital Problems    Diagnosis  POA    **JANNETH (acute kidney injury) [N17.9]  Yes         ASSESSMENT/PLAN:    JANNETH on CKD stage IIIa  Non-elevated anion gap Metabolic acidosis, 2/2 above, resolved   Hyperkalemia, resolved  Hypomagnesemia  Patient presented at the direction of nephrology due to laboratory abnormalities.  Creatinine 3.03 on admission with baseline 1.2-1.5.  Uptrend overnight to 2.8  Continuing fluid replacement with sodium bicarb in half normal saline.  Avoid nephrotoxins as able  Nephrology following, assistance appreciated. Feel JANNETH likely ATN vs less likely prerenal azotemia.   Continue to correct electrolyte abnormalities as indicated. Nephrology added 1 dose lokelma today. Mag replaced overnight.   Repeat BMP and magnesium in the a.m..  Continue to hold lisinopril   Recent bilateral renal ultrasound only noted simple cyst right kidney.  Monitor per telemetry.     Chronic:  IDDM 2  Hypertension  Hyperlipidemia  Home med regimen continued as indicated  A1c is 7.  Continue 10 units Lantus nightly as well as low-dose sliding scale.  Monitor and adjust as needed. BG is well controlled      -----------  -DVT prophylaxis: Subcu heparin   -Disposition plans/anticipated needs: Pending course        The patient is high risk due to the following diagnoses/reasons:  JANNETH, electrolyte derangements         Sathish Alford PA-C  11/09/23  09:53 EST     Electronically signed by Toy Catalan MD at 11/09/23 1424       Jean Claude Blankenship MD at 11/09/23 0788          Nephrology Progress " "Note      Subjective     Patient denied any chest pain, shortness of breath feeling much better.    Objective       Vital signs :     Temp:  [98 °F (36.7 °C)-98.3 °F (36.8 °C)] 98 °F (36.7 °C)  Heart Rate:  [59-70] 59  Resp:  [16-22] 22  BP: (154-195)/(74-92) 175/86    Intake/Output                         11/07/23 0701 - 11/08/23 0700 11/08/23 0701 - 11/09/23 0700     [id number] 5972-3185 Total 5550-3176 3781-7182 Total                 Intake    P.O.  --  120 120  720  120 840    I.V.  50 2000 2050  --  -- --    IV Piggyback  2000  -- 2000  --  -- --    Total Intake 2050 2120 4170 720 120 840       Output    Urine  --  1000 1000  --  1125 1125    Total Output -- 1000 1000 -- 1125 1125             Physical Exam:    General Appearance : alert, pleasant, appears stated age, cooperative and alert  Lungs : clear to auscultation, respirations regular  Heart :  regular rhythm & normal rate, normal S1, S2 and no murmur, no rub  Abdomen : Soft, nondistended  Extremities : No edema,   Neurologic :   orientated to person, place, time and situation, Grossly no focal deficits        Laboratory Data :     Albumin Albumin   Date Value Ref Range Status   11/07/2023 4.2 3.5 - 5.2 g/dL Final      Magnesium Magnesium   Date Value Ref Range Status   11/09/2023 1.4 (L) 1.6 - 2.4 mg/dL Final   11/07/2023 1.3 (L) 1.6 - 2.4 mg/dL Final          PTH               No results found for: \"PTH\"    CBC and coagulation:  Results from last 7 days   Lab Units 11/09/23  0045 11/08/23  0119 11/07/23  0701   WBC 10*3/mm3 8.53 7.14 8.40   HEMOGLOBIN g/dL 10.3* 10.1* 11.8*   HEMATOCRIT % 31.9* 32.1* 39.0   MCV fL 97.6* 98.5* 101.0*   MCHC g/dL 32.3 31.5 30.3*   PLATELETS 10*3/mm3 218 198 244     Acid/base balance:      Renal and electrolytes:    Results from last 7 days   Lab Units 11/09/23  0045 11/08/23  0119 11/07/23  1203 11/07/23  0701   SODIUM mmol/L 144 143 140 140   POTASSIUM mmol/L 5.2 5.0 5.9* 5.8*   MAGNESIUM mg/dL 1.4*  --   --  1.3* "   CHLORIDE mmol/L 110* 113* 115* 110*   CO2 mmol/L 26.1 20.2* 16.1* 20.4*   BUN mg/dL 47* 52* 57* 61*   CREATININE mg/dL 2.83* 2.54* 2.69* 3.03*   CALCIUM mg/dL 8.6 8.4* 8.5* 9.2     Estimated Creatinine Clearance: 34.5 mL/min (A) (by C-G formula based on SCr of 2.83 mg/dL (H)).  @GFRCG:3@   Liver and pancreatic function:  Results from last 7 days   Lab Units 11/07/23  0701   ALBUMIN g/dL 4.2   BILIRUBIN mg/dL 0.4   ALK PHOS U/L 87   AST (SGOT) U/L 15   ALT (SGPT) U/L 11         Cardiac:      Liver and pancreatic function:  Results from last 7 days   Lab Units 11/07/23  0701   ALBUMIN g/dL 4.2   BILIRUBIN mg/dL 0.4   ALK PHOS U/L 87   AST (SGOT) U/L 15   ALT (SGPT) U/L 11       Medications :     atorvastatin, 10 mg, Oral, Daily  calcitriol, 0.25 mcg, Oral, Once per day on Mon Wed Fri  [START ON 11/12/2023] cloNIDine, 1 patch, Transdermal, Weekly  heparin (porcine), 5,000 Units, Subcutaneous, Q8H  hydrALAZINE, 50 mg, Oral, TID  insulin glargine, 10 Units, Subcutaneous, Nightly  insulin lispro, 2-7 Units, Subcutaneous, 4x Daily AC & at Bedtime  pantoprazole, 40 mg, Oral, Q AM  senna-docusate sodium, 2 tablet, Oral, BID  sodium chloride, 10 mL, Intravenous, Q12H  tamsulosin, 0.4 mg, Oral, Daily      sodium bicarbonate 8.4 % 75 mEq in sodium chloride 0.45 % 1,000 mL infusion (greater than 75 mEq), 75 mEq, Last Rate: 150 mL/hr at 11/09/23 0245          Assessment & Plan     - JANNETH, nonoliguric  - Chronic kidney disease stage III  - Normal anion gap metabolic acidosis  - Hyperkalemia  - Essential hypertension, uncontrolled  - Type 2 diabetes mellitus     Creatinine is slightly worse today 2.8 from 2.5 likely working diagnosis is more ATN than prerenal  potassium is upper limit normal will give 1 dose of Lokelma today  No overt fluid overload clinically will continue on balanced bicarb fluid for now.  We will do a bladder scan to rule out urinary retention.  Educated and counseled the patient will continue monitoring  inpatient for now    JANNETH is most likely due to prerenal azotemia and hyperkalemia is likely due to ACE inhibitors.  Other differentials include impaired potassium excretion in settings of poor distal sodium delivery due to prerenal state.  Baseline creatinine appears to be 1.5, admitted with 3.  No hematuria, no proteinuria  No obstruction on renal ultrasound  - Continue hydralazine 50 mg 3 times daily  - Continue on balanced bicarb fluid for now  - Continue holding lisinopril  - Educated and counseled the patient for low K diet      Jean Claude Blankenship MD  23  07:24 EST      Electronically signed by Jean Claude Blankenship MD at 23 0948       Sathish Alford PA-C at 23 0804       Attestation signed by Toy Catalan MD at 23 1509    I have reviewed this documentation and agree.  Potassium normalized, creatinine improving, follow up Nephrology recommendations.                   Patient Identification:  Name:  Moe Smith  Age:  68 y.o.  Sex:  male  :  1955  MRN:  [id number]  Visit Number:  [id number]  Primary Care Provider:  Mendoza Sharma MD    Length of stay:  0    Subjective/Interval History/Consultants/Procedures     Chief Complaint:   Chief Complaint   Patient presents with    Abnormal Lab       Subjective/Interval History:    68 y.o. male who was admitted on 2023 with JANNETH, hyperkalemia     PMH is significant for  CKD stage IIIa, insulin-dependent type 2 diabetes mellitus, essential hypertension, hyperlipidemia, and obesity by BMI   For complete admission information, please see history and physical.     Consultations:  nephrology     Procedures/Scans:  CT abdomen pelvis without contrast    Today, the patient  was resting comfortably. He reported he didn't sleep well though at the time of exam stated he felt well overall. Later in the day complaining of some nausea and did add PRN zofran. Denied any palpitations or chest pain.    Room location at the time of evaluation  was 304b.    ----------------------------------------------------------------------------------------------------------------------  Current Hospital Meds:  atorvastatin, 10 mg, Oral, Daily  calcitriol, 0.25 mcg, Oral, Once per day on Mon Wed Fri  [START ON 11/12/2023] cloNIDine, 1 patch, Transdermal, Weekly  heparin (porcine), 5,000 Units, Subcutaneous, Q8H  hydrALAZINE, 50 mg, Oral, TID  insulin glargine, 10 Units, Subcutaneous, Nightly  insulin lispro, 2-7 Units, Subcutaneous, 4x Daily AC & at Bedtime  pantoprazole, 40 mg, Oral, Q AM  senna-docusate sodium, 2 tablet, Oral, BID  sodium chloride, 10 mL, Intravenous, Q12H  tamsulosin, 0.4 mg, Oral, Daily      sodium bicarbonate 8.4 % 75 mEq in sodium chloride 0.45 % 1,000 mL infusion (greater than 75 mEq), 75 mEq, Last Rate: 150 mL/hr at 11/08/23 0749      ----------------------------------------------------------------------------------------------------------------------      Objective     Vital Signs:  Temp:  [97.7 °F (36.5 °C)-98.4 °F (36.9 °C)] 98.4 °F (36.9 °C)  Heart Rate:  [61-85] 65  Resp:  [16-20] 20  BP: (109-192)/() 158/76      11/07/23  0642 11/07/23  1429 11/08/23  0545   Weight: 124 kg (273 lb) 126 kg (278 lb) 129 kg (283 lb 8 oz)     Body mass index is 40.68 kg/m².    Intake/Output Summary (Last 24 hours) at 11/8/2023 0804  Last data filed at 11/8/2023 0426  Gross per 24 hour   Intake 4170 ml   Output 1000 ml   Net 3170 ml     No intake/output data recorded.  Diet: Diabetic Diets, Renal Diets; Consistent Carbohydrate; Low Potassium; Texture: Regular Texture (IDDSI 7); Fluid Consistency: Thin (IDDSI 0)  ----------------------------------------------------------------------------------------------------------------------    Physical Exam  Vitals and nursing note reviewed.   Constitutional:       General: He is not in acute distress.     Appearance: He is obese.   HENT:      Head: Normocephalic and atraumatic.   Eyes:      Extraocular Movements:  "Extraocular movements intact.      Conjunctiva/sclera: Conjunctivae normal.   Cardiovascular:      Rate and Rhythm: Normal rate.   Musculoskeletal:      Right lower leg: No edema.      Left lower leg: No edema.   Skin:     General: Skin is warm.   Neurological:      Mental Status: He is alert. Mental status is at baseline.   Psychiatric:         Mood and Affect: Mood normal.         Behavior: Behavior normal.                ----------------------------------------------------------------------------------------------------------------------  Tele:      ----------------------------------------------------------------------------------------------------------------------  Results from last 7 days   Lab Units 11/07/23  0701   CK TOTAL U/L 83     Results from last 7 days   Lab Units 11/08/23  0119 11/07/23 0701   WBC 10*3/mm3 7.14 8.40   HEMOGLOBIN g/dL 10.1* 11.8*   HEMATOCRIT % 32.1* 39.0   MCV fL 98.5* 101.0*   MCHC g/dL 31.5 30.3*   PLATELETS 10*3/mm3 198 244         Results from last 7 days   Lab Units 11/08/23  0119 11/07/23  1203 11/07/23 0701   SODIUM mmol/L 143 140 140   POTASSIUM mmol/L 5.0 5.9* 5.8*   MAGNESIUM mg/dL  --   --  1.3*   CHLORIDE mmol/L 113* 115* 110*   CO2 mmol/L 20.2* 16.1* 20.4*   BUN mg/dL 52* 57* 61*   CREATININE mg/dL 2.54* 2.69* 3.03*   CALCIUM mg/dL 8.4* 8.5* 9.2   GLUCOSE mg/dL 90 86 82   ALBUMIN g/dL  --   --  4.2   BILIRUBIN mg/dL  --   --  0.4   ALK PHOS U/L  --   --  87   AST (SGOT) U/L  --   --  15   ALT (SGPT) U/L  --   --  11   Estimated Creatinine Clearance: 38.6 mL/min (A) (by C-G formula based on SCr of 2.54 mg/dL (H)).  No results found for: \"AMMONIA\"      No results found for: \"BLOODCX\"  No results found for: \"URINECX\"  No results found for: \"WOUNDCX\"  No results found for: \"STOOLCX\"  ----------------------------------------------------------------------------------------------------------------------  Imaging Results (Last 24 Hours)       Procedure Component Value Units " Date/Time    CT Abdomen Pelvis Without Contrast [[id number]] Collected: 11/07/23 0820     Updated: 11/07/23 0822    Narrative:      EXAM:    CT Abdomen and Pelvis Without Intravenous Contrast     EXAM DATE:    11/7/2023 8:51 AM     CLINICAL HISTORY:    JANNETH     TECHNIQUE:    Axial computed tomography images of the abdomen and pelvis without  intravenous contrast.  Sagittal and coronal reformatted images were  created and reviewed.  This CT exam was performed using one or more of  the following dose reduction techniques:  automated exposure control,  adjustment of the mA and/or kV according to patient size, and/or use of  iterative reconstruction technique.     COMPARISON:    6/14/2023     FINDINGS:    LUNG BASES:  Unremarkable as visualized.  No mass.  No consolidation.      ABDOMEN:    LIVER:  Unremarkable as visualized.    GALLBLADDER AND BILE DUCTS:  Unremarkable as visualized.  No calcified  stones.  No ductal dilation.    PANCREAS:  Unremarkable as visualized.  No ductal dilation.    SPLEEN:  Unremarkable as visualized.  No splenomegaly.    ADRENALS:  Unremarkable as visualized.  No mass.    KIDNEYS AND URETERS:  Unremarkable as visualized.  No obstructing  stones.  No hydronephrosis.    STOMACH AND BOWEL:  Scattered diverticula in the colon.  No  diverticulitis.  No obstruction.      PELVIS:    APPENDIX:  No findings to suggest acute appendicitis.    BLADDER:  Unremarkable as visualized.  No stones.    REPRODUCTIVE:  Unremarkable as visualized.      ABDOMEN and PELVIS:    INTRAPERITONEAL SPACE:  Unremarkable as visualized.  No free air.  No  significant fluid collection.    BONES/JOINTS:  Degenerative disc disease throughout the lumbar spine.   Degenerative facet arthropathy throughout the lumbar spine, most  prominent in the lower lumbar spine.  No acute fracture.  No  dislocation.    SOFT TISSUES:  Unremarkable as visualized.    VASCULATURE:  Atherosclerotic disease.  No abdominal aortic aneurysm.    LYMPH  NODES:  Unremarkable as visualized.  No enlarged lymph nodes.       Impression:        Degenerative changes lumbar spine as described.        This report was finalized on 11/7/2023 8:20 AM by Dr. Raul Buchanan MD.             ----------------------------------------------------------------------------------------------------------------------   I have reviewed the above laboratory values for 11/08/23    Assessment/Plan     Active Hospital Problems    Diagnosis  POA    **JANNETH (acute kidney injury) [N17.9]  Yes         ASSESSMENT/PLAN:    JANNETH on CKD stage IIIa  Metabolic acidosis, 2/2 above  Hyperkalemia, resolved  Hypomagnesemia  Patient presented at the direction of nephrology due to laboratory abnormalities.  Creatinine 3.03 on admission with baseline 1.2-1.5.  Now improved to 2.54.  Continuing fluid replacement with sodium bicarb in half normal saline.  Avoid nephrotoxins as able  Repeat BMP and magnesium in the a.m.  Continue to correct electrolyte abnormalities as indicated  Nephrology consulted, assistance appreciated.  Follow-up recommendations.  Recent bilateral renal ultrasound only noted simple cyst right kidney.  Monitor per telemetry.    Chronic:  IDDM 2  Hypertension  Hyperlipidemia  Home med regimen continued as indicated  A1c is 7.  Continue 10 units Lantus nightly as well as low-dose sliding scale.  Monitor and adjust as needed.  -----------  -DVT prophylaxis: Subcu heparin  -Disposition plans/anticipated needs: Pending course, likely return home 24-48 hours.        The patient is high risk due to the following diagnoses/reasons: JANNETH, hyperkalemia, hypomagnesemia        Sathish Alford PA-C  11/08/23  08:04 EST     Electronically signed by Toy Catalan MD at 11/08/23 1509          Consult Notes (all)        Jean Claude Blankenship MD at 11/08/23 0739          Nephrology Consult Note    Referring Provider: Dr. Catalan  Reason for Consultation: Elevated creatinine, elevated potassium    Subjective       History  "of present illness:  Moe Smith is a 68 y.o. male who presented to [facility] emergency department with chief complaint of not feeling well, generalized weakness and elevated potassium level.  Patient was sent to the hospital after having a high potassium level.  Patient is known to have a chronic kidney disease with baseline creatinine appears to be 1.5\" and follows up outpatient nephrology clinic.  Patient has been taking lisinopril that he sees the dose has not been changed recently.  Patient stated he has been eating potatoes for the past few days.  On arrival his creatinine was found to be 3 that has dropped to 2.5 this morning.  Patient's initial potassium was 5.9 that has improved.  Denied chronic NSAIDS use. Patient denies hematuria, dysuria, difficulty passing urine. No prior history of renal stones. No family history of renal disease    History  History reviewed. No pertinent past medical history., No past surgical history on file., History reviewed. No pertinent family history.,   Social History     Tobacco Use    Smoking status: Never    Smokeless tobacco: Never   Vaping Use    Vaping Use: Never used   Substance Use Topics    Alcohol use: Never    Drug use: Never   ,   Medications Prior to Admission   Medication Sig Dispense Refill Last Dose    atorvastatin (LIPITOR) 10 MG tablet Take 1 tablet by mouth Daily.   11/6/2023    calcitriol (ROCALTROL) 0.25 MCG capsule Take 1 capsule by mouth 3 (Three) Times a Week.   11/6/2023    cloNIDine (CATAPRES-TTS) 0.3 MG/24HR patch Place 1 patch on the skin as directed by provider 1 (One) Time Per Week.   11/5/2023    dapagliflozin Propanediol (Farxiga) 10 MG tablet Take 10 mg by mouth Daily.   11/6/2023    gabapentin (NEURONTIN) 400 MG capsule Take 1 capsule by mouth 3 (Three) Times a Day As Needed (nerve pain).   11/6/2023    glimepiride (AMARYL) 4 MG tablet Take 2 tablets by mouth Every Morning Before Breakfast.   11/6/2023    insulin degludec " (Tresiba FlexTouch) 100 UNIT/ML solution pen-injector injection Inject 10 Units under the skin into the appropriate area as directed Every Night.   11/6/2023    lisinopril (PRINIVIL,ZESTRIL) 20 MG tablet Take 1 tablet by mouth 2 (Two) Times a Day.   11/6/2023    omeprazole (priLOSEC) 20 MG capsule Take 1 capsule by mouth Daily.   11/6/2023    tamsulosin (FLOMAX) 0.4 MG capsule 24 hr capsule Take 1 capsule by mouth Daily.   11/6/2023   , Scheduled Meds:  atorvastatin, 10 mg, Oral, Daily  calcitriol, 0.25 mcg, Oral, Once per day on Mon Wed Fri  [START ON 11/12/2023] cloNIDine, 1 patch, Transdermal, Weekly  heparin (porcine), 5,000 Units, Subcutaneous, Q8H  hydrALAZINE, 50 mg, Oral, TID  insulin glargine, 10 Units, Subcutaneous, Nightly  insulin lispro, 2-7 Units, Subcutaneous, 4x Daily AC & at Bedtime  pantoprazole, 40 mg, Oral, Q AM  senna-docusate sodium, 2 tablet, Oral, BID  sodium chloride, 10 mL, Intravenous, Q12H  tamsulosin, 0.4 mg, Oral, Daily    , Continuous Infusions:  sodium bicarbonate 8.4 % 75 mEq in sodium chloride 0.45 % 1,000 mL infusion (greater than 75 mEq), 75 mEq, Last Rate: 75 mEq (11/08/23 0538)    , PRN Meds:    acetaminophen    senna-docusate sodium **AND** polyethylene glycol **AND** bisacodyl **AND** bisacodyl    dextrose    dextrose    glucagon (human recombinant)    [COMPLETED] Insert Peripheral IV **AND** sodium chloride    sodium chloride    sodium chloride and Allergies:  Sulfa antibiotics    Review of Systems  More than 10 point review of systems was done. Pertinent items are noted in HPI, all other systems reviewed and negative    Objective     Vital Signs  Temp:  [97.7 °F (36.5 °C)-98.4 °F (36.9 °C)] 98.4 °F (36.9 °C)  Heart Rate:  [61-85] 65  Resp:  [16-20] 20  BP: (109-192)/() 158/76    Intake/Output                   11/07/23 0701 - 11/08/23 0700     [id number] Total              Intake    P.O.  --  120 120    I.V.  50 2000 2050    IV Piggyback  2000  -- 2000     "Total Intake 2050 2120 4170       Output    Urine  --  1000 1000    Total Output -- 1000 1000             Physical Examination:  General Appearance: not in acute distress  No JVD  Lungs: Clear to auscultation, respirations regular and unlabored  Heart: Regular rhythm & normal rate, normal S1, S2, no murmur, no gallop, no rub   Abdomen: Normal bowel sounds, no masses and soft non-tender  Extremities: No edema  Neurologic: Orientated to person, place, time, grossly no focal deficitis    Laboratory Data :      WBC WBC   Date Value Ref Range Status   11/08/2023 7.14 3.40 - 10.80 10*3/mm3 Final   11/07/2023 8.40 3.40 - 10.80 10*3/mm3 Final      HGB Hemoglobin   Date Value Ref Range Status   11/08/2023 10.1 (L) 13.0 - 17.7 g/dL Final   11/07/2023 11.8 (L) 13.0 - 17.7 g/dL Final      HCT Hematocrit   Date Value Ref Range Status   11/08/2023 32.1 (L) 37.5 - 51.0 % Final   11/07/2023 39.0 37.5 - 51.0 % Final      Platlets No results found for: \"LABPLAT\"   MCV MCV   Date Value Ref Range Status   11/08/2023 98.5 (H) 79.0 - 97.0 fL Final   11/07/2023 101.0 (H) 79.0 - 97.0 fL Final          Sodium Sodium   Date Value Ref Range Status   11/08/2023 143 136 - 145 mmol/L Final   11/07/2023 140 136 - 145 mmol/L Final   11/07/2023 140 136 - 145 mmol/L Final      Potassium Potassium   Date Value Ref Range Status   11/08/2023 5.0 3.5 - 5.2 mmol/L Final   11/07/2023 5.9 (H) 3.5 - 5.2 mmol/L Final     Comment:     Slight hemolysis detected by analyzer. Result may be falsely elevated.   11/07/2023 5.8 (H) 3.5 - 5.2 mmol/L Final      Chloride Chloride   Date Value Ref Range Status   11/08/2023 113 (H) 98 - 107 mmol/L Final   11/07/2023 115 (H) 98 - 107 mmol/L Final   11/07/2023 110 (H) 98 - 107 mmol/L Final      CO2 CO2   Date Value Ref Range Status   11/08/2023 20.2 (L) 22.0 - 29.0 mmol/L Final   11/07/2023 16.1 (L) 22.0 - 29.0 mmol/L Final   11/07/2023 20.4 (L) 22.0 - 29.0 mmol/L Final      BUN BUN   Date Value Ref Range Status " "  11/08/2023 52 (H) 8 - 23 mg/dL Final   11/07/2023 57 (H) 8 - 23 mg/dL Final   11/07/2023 61 (H) 8 - 23 mg/dL Final      Creatinine Creatinine   Date Value Ref Range Status   11/08/2023 2.54 (H) 0.76 - 1.27 mg/dL Final   11/07/2023 2.69 (H) 0.76 - 1.27 mg/dL Final   11/07/2023 3.03 (H) 0.76 - 1.27 mg/dL Final      Calcium Calcium   Date Value Ref Range Status   11/08/2023 8.4 (L) 8.6 - 10.5 mg/dL Final   11/07/2023 8.5 (L) 8.6 - 10.5 mg/dL Final   11/07/2023 9.2 8.6 - 10.5 mg/dL Final      PO4 No results found for: \"CAPO4\"   Albumin Albumin   Date Value Ref Range Status   11/07/2023 4.2 3.5 - 5.2 g/dL Final      Magnesium Magnesium   Date Value Ref Range Status   11/07/2023 1.3 (L) 1.6 - 2.4 mg/dL Final      Uric Acid No results found for: \"URICACID\"     Radiology results :     Imaging Results (Last 72 Hours)       Procedure Component Value Units Date/Time    CT Abdomen Pelvis Without Contrast [[id number]] Collected: 11/07/23 0820     Updated: 11/07/23 0822    Narrative:      EXAM:    CT Abdomen and Pelvis Without Intravenous Contrast     EXAM DATE:    11/7/2023 8:51 AM     CLINICAL HISTORY:    JANNETH     TECHNIQUE:    Axial computed tomography images of the abdomen and pelvis without  intravenous contrast.  Sagittal and coronal reformatted images were  created and reviewed.  This CT exam was performed using one or more of  the following dose reduction techniques:  automated exposure control,  adjustment of the mA and/or kV according to patient size, and/or use of  iterative reconstruction technique.     COMPARISON:    6/14/2023     FINDINGS:    LUNG BASES:  Unremarkable as visualized.  No mass.  No consolidation.      ABDOMEN:    LIVER:  Unremarkable as visualized.    GALLBLADDER AND BILE DUCTS:  Unremarkable as visualized.  No calcified  stones.  No ductal dilation.    PANCREAS:  Unremarkable as visualized.  No ductal dilation.    SPLEEN:  Unremarkable as visualized.  No splenomegaly.    ADRENALS:  Unremarkable as " visualized.  No mass.    KIDNEYS AND URETERS:  Unremarkable as visualized.  No obstructing  stones.  No hydronephrosis.    STOMACH AND BOWEL:  Scattered diverticula in the colon.  No  diverticulitis.  No obstruction.      PELVIS:    APPENDIX:  No findings to suggest acute appendicitis.    BLADDER:  Unremarkable as visualized.  No stones.    REPRODUCTIVE:  Unremarkable as visualized.      ABDOMEN and PELVIS:    INTRAPERITONEAL SPACE:  Unremarkable as visualized.  No free air.  No  significant fluid collection.    BONES/JOINTS:  Degenerative disc disease throughout the lumbar spine.   Degenerative facet arthropathy throughout the lumbar spine, most  prominent in the lower lumbar spine.  No acute fracture.  No  dislocation.    SOFT TISSUES:  Unremarkable as visualized.    VASCULATURE:  Atherosclerotic disease.  No abdominal aortic aneurysm.    LYMPH NODES:  Unremarkable as visualized.  No enlarged lymph nodes.       Impression:        Degenerative changes lumbar spine as described.        This report was finalized on 11/7/2023 8:20 AM by Dr. Raul Buchanan MD.                 Medications:      atorvastatin, 10 mg, Oral, Daily  calcitriol, 0.25 mcg, Oral, Once per day on Mon Wed Fri  [START ON 11/12/2023] cloNIDine, 1 patch, Transdermal, Weekly  heparin (porcine), 5,000 Units, Subcutaneous, Q8H  hydrALAZINE, 50 mg, Oral, TID  insulin glargine, 10 Units, Subcutaneous, Nightly  insulin lispro, 2-7 Units, Subcutaneous, 4x Daily AC & at Bedtime  pantoprazole, 40 mg, Oral, Q AM  senna-docusate sodium, 2 tablet, Oral, BID  sodium chloride, 10 mL, Intravenous, Q12H  tamsulosin, 0.4 mg, Oral, Daily      sodium bicarbonate 8.4 % 75 mEq in sodium chloride 0.45 % 1,000 mL infusion (greater than 75 mEq), 75 mEq, Last Rate: 75 mEq (11/08/23 0538)        Assessment & Plan       JANNETH (acute kidney injury)      - JANNETH, nonoliguric  - Chronic kidney disease stage III  - Normal anion gap metabolic acidosis  - Hyperkalemia  - Essential  hypertension, uncontrolled  - Type 2 diabetes mellitus    JANNETH is most likely due to prerenal azotemia and hyperkalemia is likely due to ACE inhibitors.  Other differentials include impaired potassium excretion in settings of poor distal sodium delivery due to prerenal state.  Baseline creatinine appears to be 1.5, admitted with 3.  No hematuria, no proteinuria  No obstruction on renal ultrasound  - Increase hydralazine 50 mg 3 times daily  - Continue on balanced bicarb fluid for now  - Continue holding lisinopril  - Educated and counseled the patient for low K diet    - Avoid any nephrotoxic agents, hypotension and adjust medications according to estimated GFR  Thanks for the consult. Nephrology will follow the patient.   I discussed the patient's findings and my recommendations with patient and nursing staff    Jean Claude Blankenship MD  11/08/23  07:24 EST      Electronically signed by Jean Claude Blankenship MD at 11/08/23 6941

## 2023-11-10 NOTE — PLAN OF CARE
Goal Outcome Evaluation:     Patient resting in bed. No complaints voiced. Bp elevated this shift, PA aware, see orders, see MAR. Mag 1.3, will replace. Will continue to follow plan of care this shift.

## 2023-11-10 NOTE — PROGRESS NOTES
"Nephrology Progress Note      Subjective     Patient denied any chest pain, shortness of breath and wants to go home today    Objective       Vital signs :     Temp:  [97.6 °F (36.4 °C)-98.4 °F (36.9 °C)] 98.1 °F (36.7 °C)  Heart Rate:  [58-74] 70  Resp:  [20-22] 20  BP: (165-209)/() 177/78    Intake/Output                         11/08/23 0701 - 11/09/23 0700 11/09/23 0701 - 11/10/23 0700     [id number] 9795-9245 Total 7609-3666 9870-7557 Total                 Intake    P.O.  720  120 840  720  240 960    Total Intake 720 120 840 720 240 960       Output    Urine  --  1125 1125  --  1025 1025    Total Output -- 1125 1125 -- 1025 1025             Physical Exam:    General Appearance : alert, pleasant, appears stated age, cooperative and alert  Lungs : clear to auscultation, respirations regular  Heart :  regular rhythm & normal rate, normal S1, S2 and no murmur, no rub  Abdomen : Soft, nondistended  Extremities : No edema,   Neurologic :   orientated to person, place, time and situation, Grossly no focal deficits        Laboratory Data :     Albumin No results found for: \"ALBUMIN\"     Magnesium Magnesium   Date Value Ref Range Status   11/10/2023 1.3 (L) 1.6 - 2.4 mg/dL Final   11/09/2023 1.4 (L) 1.6 - 2.4 mg/dL Final          PTH               No results found for: \"PTH\"    CBC and coagulation:  Results from last 7 days   Lab Units 11/10/23  0216 11/09/23  0045 11/08/23  0119   WBC 10*3/mm3 8.97 8.53 7.14   HEMOGLOBIN g/dL 9.8* 10.3* 10.1*   HEMATOCRIT % 31.6* 31.9* 32.1*   MCV fL 98.8* 97.6* 98.5*   MCHC g/dL 31.0* 32.3 31.5   PLATELETS 10*3/mm3 215 218 198     Acid/base balance:      Renal and electrolytes:    Results from last 7 days   Lab Units 11/10/23  0216 11/09/23  0045 11/08/23  0119 11/07/23  1203 11/07/23  0701   SODIUM mmol/L 139 144 143 140 140   POTASSIUM mmol/L 5.0 5.2 5.0 5.9* 5.8*   MAGNESIUM mg/dL 1.3* 1.4*  --   --  1.3*   CHLORIDE mmol/L 106 110* 113* 115* 110*   CO2 mmol/L 25.6 26.1 20.2* " 16.1* 20.4*   BUN mg/dL 40* 47* 52* 57* 61*   CREATININE mg/dL 2.90* 2.83* 2.54* 2.69* 3.03*   CALCIUM mg/dL 8.6 8.6 8.4* 8.5* 9.2     Estimated Creatinine Clearance: 33.5 mL/min (A) (by C-G formula based on SCr of 2.9 mg/dL (H)).  @GFRCG:3@   Liver and pancreatic function:  Results from last 7 days   Lab Units 11/07/23  0701   ALBUMIN g/dL 4.2   BILIRUBIN mg/dL 0.4   ALK PHOS U/L 87   AST (SGOT) U/L 15   ALT (SGPT) U/L 11         Cardiac:      Liver and pancreatic function:  Results from last 7 days   Lab Units 11/07/23  0701   ALBUMIN g/dL 4.2   BILIRUBIN mg/dL 0.4   ALK PHOS U/L 87   AST (SGOT) U/L 15   ALT (SGPT) U/L 11       Medications :     atorvastatin, 10 mg, Oral, Daily  calcitriol, 0.25 mcg, Oral, Once per day on Mon Wed Fri  [START ON 11/12/2023] cloNIDine, 1 patch, Transdermal, Weekly  heparin (porcine), 5,000 Units, Subcutaneous, Q8H  hydrALAZINE, 50 mg, Oral, TID  insulin glargine, 10 Units, Subcutaneous, Nightly  insulin lispro, 2-7 Units, Subcutaneous, 4x Daily AC & at Bedtime  pantoprazole, 40 mg, Oral, Q AM  senna-docusate sodium, 2 tablet, Oral, BID  sodium chloride, 10 mL, Intravenous, Q12H  tamsulosin, 0.4 mg, Oral, Daily      sodium bicarbonate 8.4 % 75 mEq in sodium chloride 0.45 % 1,000 mL infusion (greater than 75 mEq), 75 mEq, Last Rate: 150 mL/hr at 11/09/23 0245          Assessment & Plan     - JANNETH, nonoliguric  - Chronic kidney disease stage III  - Normal anion gap metabolic acidosis  - Hyperkalemia  - Essential hypertension, uncontrolled  - Type 2 diabetes mellitus     Creatinine is 2.9, remains nonoliguric.  ATN likely plateauing, given no proteinuria and bland urinary sediment, its reassuring.  Blood pressure is still not well controlled, will increase hydralazine to 75 mg 3 times daily    Patient wants to go home, he has an established outpatient nephrology care and agreeable to follow-up in clinic on Friday with labs to be done on Thursday.  Patient can be discharged home from  nephrology standpoint.  Educated and counseled the patient to avoid nephrotoxic agents, monitor blood pressure and hold hydralazine if the blood pressure is less than 120 mmHg systolic.  Patient was also counseled for fluid intake and low K diet    JANNETH is most likely due to prerenal azotemia and hyperkalemia is likely due to ACE inhibitors.  Other differentials include impaired potassium excretion in settings of poor distal sodium delivery due to prerenal state.  Baseline creatinine appears to be 1.5, admitted with 3.  No hematuria, no proteinuria  No obstruction on renal ultrasound  -Discontinue balanced bicarb fluid for now  - Continue holding lisinopril  - Educated and counseled the patient for low K diet      Jean Claude Blankenship MD  11/10/23  07:21 EST

## 2023-11-10 NOTE — DISCHARGE SUMMARY
[facility] HOSPITALIST DISCHARGE SUMMARY    Patient Identification:  Name:  Moe Smith  Age:  68 y.o.  Sex:  male  :  1955  MRN:  [id number]  Visit Number:  [id number]    Date of Admission: 2023  Date of Discharge:  11/10/23     PCP: Mendoza Sharma MD    Discharging Provider: Tavon Alford PA-C / Dr. Catalan    Discharge Diagnoses     Discharge Diagnoses:  JANNETH on CKD IIIa, likely 2/2 ATN  Normal anion gap metabolic acidosis, 2/2 above, resolved   Hyperkalemia, likely 2/2 ACEi, resolved   Hypomagnesemia, stable     Secondary Diagnoses:  IDDM 2  Hypertension  Hyperlipidemia     Needs on follow up:  PCP and nephrology clinic in 1 week  Consults/Procedures     Consults:   Consults       Date and Time Order Name Status Description    2023  2:20 PM Inpatient Nephrology Consult              Procedures/Scans Performed:  CT abdomen pelvis without contrast         History of Presenting Illness     Chief Complaint   Patient presents with    Abnormal Lab       Patient is a 68 y.o. male who presented to [facility] complaining of abnormal lab.  Please see the admitting history and physical for further details.      Hospital Course     Patient was admitted to [facility] on 23 following presentation to [facility] ED at the direction of his outpatient nephrologist after routine labs revealed multiple abnormalities. He reported a history of hyperkalemia and had recently been switched from losartan to lisinopril due to this. He denied any palpitations or chest pain. Stated PO intake was at baseline. Work up revealed potassium 5.8, creatinine 3.03 with unclear baseline though most recent labs were around 1.4, magnesium 1.3, normal anion gap and CO2 20.4. He had a recent bilateral renal ultrasound on file which was only notable for simple, right renal cyst. He received mag sulfate, normal saline, lactulose and lokelma in the ED. Balanced bicarb infusion was started and he was admitted to the  telemetry unit for further work up and management. Nephrology was consulted and followed.     Lisinopril and other potentially nephrotoxic agents (farxiga, gabapentin) were held on admission. Nephrology agreed hyperkalemia likely 2/2 ACEi vs impaired renal excretion. Lisinopril will remain held at discharge. BP has been difficult to control during admission. Hydralazine initiated and titrated to 75 mg TID and will be continued at discharge in addition to home clonidine. Electrolyte derangements have been corrected as needed per protocol. Creatinine initially improved some with fluid replacement however later again trending up to 2.8-2.9. JANNETH was felt to be most likely due to ATN and given no proteinuria and unremarkable urinary sediment nephrology feels likely plateaued. Given no further intervention planned and patient overall stable, it is felt he can be discharged home with close follow up with his outpatient nephrologist in 1 week with plan for repeat labs at that time as well. He will also be scheduled to follow up with PCP in 1 week. The above discharge plan and medication changes were discussed with the patient and he expressed agreement and understanding. He has been counseled on the importance of blood pressure monitoring/logging, a low potassium diet, and ensuring adequate fluid intake.     Discharge Vitals/Physical Examination     Vital Signs:  Temp:  [97.6 °F (36.4 °C)-98.4 °F (36.9 °C)] 98.1 °F (36.7 °C)  Heart Rate:  [58-74] 70  Resp:  [20-22] 20  BP: (165-209)/() 177/78  Mean Arterial Pressure (Non-Invasive) for the past 24 hrs (Last 3 readings):   Noninvasive MAP (mmHg)   11/10/23 0651 131   11/10/23 0455 126   11/10/23 0327 117     SpO2 Percentage    11/09/23 2315 11/10/23 0327 11/10/23 0651   SpO2: 98% 97% 97%     SpO2:  [96 %-98 %] 97 %  on   ;   Device (Oxygen Therapy): room air    Body mass index is 40.13 kg/m².  Wt Readings from Last 3 Encounters:   11/10/23 127 kg (279 lb 11.2 oz)    06/13/23 118 kg (260 lb)         Physical Exam:  Physical Exam  Vitals and nursing note reviewed.   Constitutional:       General: He is not in acute distress.     Appearance: He is obese.   HENT:      Head: Normocephalic and atraumatic.   Eyes:      Extraocular Movements: Extraocular movements intact.      Conjunctiva/sclera: Conjunctivae normal.   Cardiovascular:      Rate and Rhythm: Normal rate.   Pulmonary:      Effort: Pulmonary effort is normal. No respiratory distress.   Musculoskeletal:      Right lower leg: No edema.      Left lower leg: No edema.   Skin:     General: Skin is warm and dry.   Neurological:      Mental Status: He is alert. Mental status is at baseline.   Psychiatric:         Mood and Affect: Mood normal.         Behavior: Behavior normal.         Pertinent Laboratory/Radiology Results     Pertinent Laboratory Results:  Results from last 7 days   Lab Units 11/07/23  0701   CK TOTAL U/L 83           Results from last 7 days   Lab Units 11/10/23  0216 11/09/23  0045 11/08/23  0119   WBC 10*3/mm3 8.97 8.53 7.14   HEMOGLOBIN g/dL 9.8* 10.3* 10.1*   HEMATOCRIT % 31.6* 31.9* 32.1*   MCV fL 98.8* 97.6* 98.5*   MCHC g/dL 31.0* 32.3 31.5   PLATELETS 10*3/mm3 215 218 198     Results from last 7 days   Lab Units 11/10/23  0216 11/09/23  0045 11/08/23  0119 11/07/23  1203 11/07/23  0701   SODIUM mmol/L 139 144 143   < > 140   POTASSIUM mmol/L 5.0 5.2 5.0   < > 5.8*   MAGNESIUM mg/dL 1.3* 1.4*  --   --  1.3*   CHLORIDE mmol/L 106 110* 113*   < > 110*   CO2 mmol/L 25.6 26.1 20.2*   < > 20.4*   BUN mg/dL 40* 47* 52*   < > 61*   CREATININE mg/dL 2.90* 2.83* 2.54*   < > 3.03*   CALCIUM mg/dL 8.6 8.6 8.4*   < > 9.2   GLUCOSE mg/dL 111* 77 90   < > 82   ALBUMIN g/dL  --   --   --   --  4.2   BILIRUBIN mg/dL  --   --   --   --  0.4   ALK PHOS U/L  --   --   --   --  87   AST (SGOT) U/L  --   --   --   --  15   ALT (SGPT) U/L  --   --   --   --  11    < > = values in this interval not displayed.   Estimated  "Creatinine Clearance: 33.5 mL/min (A) (by C-G formula based on SCr of 2.9 mg/dL (H)).  No results found for: \"AMMONIA\"    Glucose   Date/Time Value Ref Range Status   11/10/2023 0654 106 70 - 130 mg/dL Final   11/09/2023 1923 184 (H) 70 - 130 mg/dL Final   11/09/2023 1700 166 (H) 70 - 130 mg/dL Final   11/09/2023 1122 128 70 - 130 mg/dL Final   11/09/2023 0712 82 70 - 130 mg/dL Final   11/08/2023 1952 155 (H) 70 - 130 mg/dL Final   11/08/2023 1618 136 (H) 70 - 130 mg/dL Final   11/08/2023 1053 104 70 - 130 mg/dL Final     Lab Results   Component Value Date    HGBA1C 7.00 (H) 11/07/2023     No results found for: \"TSH\", \"FREET4\"    No results found for: \"BLOODCX\"  No results found for: \"URINECX\"  No results found for: \"WOUNDCX\"  No results found for: \"STOOLCX\"  No results found for: \"RESPCX\"  Pain Management Panel  More data exists         Latest Ref Rng & Units 9/3/2016 8/30/2015   Pain Management Panel   Creatinine, Urine mg/dL 135.8  104.2        Pertinent Radiology Results:  Imaging Results (All)       Procedure Component Value Units Date/Time    CT Abdomen Pelvis Without Contrast [[id number]] Collected: 11/07/23 0820     Updated: 11/07/23 0822    Narrative:      EXAM:    CT Abdomen and Pelvis Without Intravenous Contrast     EXAM DATE:    11/7/2023 8:51 AM     CLINICAL HISTORY:    JANNETH     TECHNIQUE:    Axial computed tomography images of the abdomen and pelvis without  intravenous contrast.  Sagittal and coronal reformatted images were  created and reviewed.  This CT exam was performed using one or more of  the following dose reduction techniques:  automated exposure control,  adjustment of the mA and/or kV according to patient size, and/or use of  iterative reconstruction technique.     COMPARISON:    6/14/2023     FINDINGS:    LUNG BASES:  Unremarkable as visualized.  No mass.  No consolidation.      ABDOMEN:    LIVER:  Unremarkable as visualized.    GALLBLADDER AND BILE DUCTS:  Unremarkable as visualized.  No " calcified  stones.  No ductal dilation.    PANCREAS:  Unremarkable as visualized.  No ductal dilation.    SPLEEN:  Unremarkable as visualized.  No splenomegaly.    ADRENALS:  Unremarkable as visualized.  No mass.    KIDNEYS AND URETERS:  Unremarkable as visualized.  No obstructing  stones.  No hydronephrosis.    STOMACH AND BOWEL:  Scattered diverticula in the colon.  No  diverticulitis.  No obstruction.      PELVIS:    APPENDIX:  No findings to suggest acute appendicitis.    BLADDER:  Unremarkable as visualized.  No stones.    REPRODUCTIVE:  Unremarkable as visualized.      ABDOMEN and PELVIS:    INTRAPERITONEAL SPACE:  Unremarkable as visualized.  No free air.  No  significant fluid collection.    BONES/JOINTS:  Degenerative disc disease throughout the lumbar spine.   Degenerative facet arthropathy throughout the lumbar spine, most  prominent in the lower lumbar spine.  No acute fracture.  No  dislocation.    SOFT TISSUES:  Unremarkable as visualized.    VASCULATURE:  Atherosclerotic disease.  No abdominal aortic aneurysm.    LYMPH NODES:  Unremarkable as visualized.  No enlarged lymph nodes.       Impression:        Degenerative changes lumbar spine as described.        This report was finalized on 11/7/2023 8:20 AM by Dr. Raul Buchanan MD.               Discharge Disposition/Discharge Medications/Discharge Appointments     Discharge Disposition:   Home or Self Care    Condition at Discharge:  Stable     Discharge Medications:     Your medication list        START taking these medications        Instructions Last Dose Given Next Dose Due   hydrALAZINE 25 MG tablet  Commonly known as: APRESOLINE      Take 3 tablets by mouth 3 times a day.              CONTINUE taking these medications        Instructions Last Dose Given Next Dose Due   atorvastatin 10 MG tablet  Commonly known as: LIPITOR      Take 1 tablet by mouth Daily.       calcitriol 0.25 MCG capsule  Commonly known as: ROCALTROL      Take 1 capsule by mouth  3 (Three) Times a Week.       cloNIDine 0.3 MG/24HR patch  Commonly known as: CATAPRES-TTS      Place 1 patch on the skin as directed by provider 1 (One) Time Per Week.       glimepiride 4 MG tablet  Commonly known as: AMARYL      Take 2 tablets by mouth Every Morning Before Breakfast.       omeprazole 20 MG capsule  Commonly known as: priLOSEC      Take 1 capsule by mouth Daily.       tamsulosin 0.4 MG capsule 24 hr capsule  Commonly known as: FLOMAX      Take 1 capsule by mouth Daily.       Tresiba FlexTouch 100 UNIT/ML solution pen-injector injection  Generic drug: insulin degludec      Inject 10 Units under the skin into the appropriate area as directed Every Night.              STOP taking these medications      Farxiga 10 MG tablet  Generic drug: dapagliflozin Propanediol        gabapentin 400 MG capsule  Commonly known as: NEURONTIN        lisinopril 20 MG tablet  Commonly known as: PRINIVIL,ZESTRIL                  Where to Get Your Medications        These medications were sent to [address] [id number]      Hours: Monday to Friday 7 AM to 6 PM Phone: [phone]   hydrALAZINE 25 MG tablet          Discharge Diet:  Diet Instructions    Continue Diabetic / Renal diet as tolerated            Discharge Activity:  Activity Instructions    Advance activity as tolerated              Time spent on this discharge exceeded 30 minutes.

## 2023-11-10 NOTE — PLAN OF CARE
Goal Outcome Evaluation:  Patient discharged home today. IV and telemetry D/C'd. Patient A&Ox4. Patient is currently on room air. Patient is resting in bed watching television. Patient has tolerated all interventions. No complaints or concerns at this time. No signs of acute distress noted. Will continue to follow plan of care.

## 2023-11-11 ENCOUNTER — READMISSION MANAGEMENT (OUTPATIENT)
Dept: CALL CENTER | Facility: HOSPITAL | Age: 68
End: 2023-11-11
Payer: COMMERCIAL

## 2023-11-11 NOTE — OUTREACH NOTE
Prep Survey      Flowsheet Row Responses   [employer] facility patient discharged from? [facility]   Is LACE score < 7 ? No   Eligibility Readm Mgmt   Discharge diagnosis JANNETH on CKD   Does the patient have one of the following disease processes/diagnoses(primary or secondary)? Other   Does the patient have Home health ordered? No   Is there a DME ordered? No   Prep survey completed? Yes            Shanta PEREIRA - Registered Nurse

## 2023-11-11 NOTE — PAYOR COMM NOTE
"Moe Smith (68 y.o. Male)       Date of Birth   1955    Social Security Number       Address   [address] [address]    Home Phone   [phone]    MRN   [id number]       Sikh   None    Marital Status   Single                            Admission Date   11/7/23    Admission Type   Emergency    Admitting Provider   Toy Catalan MD    Attending Provider       Department, Room/Bed   [address], 3304/2S       Discharge Date   11/10/2023    Discharge Disposition   Home or Self Care    Discharge Destination   Home                              Attending Provider: (none)   Allergies: Sulfa Antibiotics    Isolation: None   Infection: None   Code Status: Prior    Ht: 177.8 cm (70\")   Wt: 127 kg (279 lb 11.2 oz)    Admission Cmt: None   Principal Problem: JANNETH (acute kidney injury) [N17.9]                   Active Insurance as of 11/7/2023       Primary Coverage       Payor Plan Insurance Group Employer/Plan Group    ANTHEM MEDICARE REPLACEMENT ANTHEM MEDICARE ADVANTAGE [id number]       Payor Plan Address Payor Plan Phone Number Payor Plan Fax Number Effective Dates    [address] [phone]  10/1/2023 - None Entered    [facility] [id number]         Subscriber Name Subscriber Birth Date Member ID       MOE SMITH 1955 [id number]                     Emergency Contacts        (Rel.) Home Phone Work Phone Mobile Phone    Jeyson Smith (Brother) [phone]                 Discharge Summary        Sathish Alford PA-C at 11/10/23 1028       Attestation signed by Toy Catalan MD at 11/10/23 1803    I have reviewed this documentation and agree. Potassium has normalized, creatinine still elevated but discussed with Nephrology and will send home to follow up with Dr. Muller on Friday, needs BMP on Thursday, follow up PCP 1 week, otherwise feels well, hold home nephrotoxic agents, continue new Hydralazine. "                       [facility] HOSPITALIST DISCHARGE SUMMARY    Patient Identification:  Name:  Moe Smith  Age:  68 y.o.  Sex:  male  :  1955  MRN:  [id number]  Visit Number:  [id number]    Date of Admission: 2023  Date of Discharge:  11/10/23     PCP: Mendoza Sharma MD    Discharging Provider: Tavon Alford PA-C / Dr. Catalan    Discharge Diagnoses     Discharge Diagnoses:  JANNETH on CKD IIIa, likely 2/2 ATN  Normal anion gap metabolic acidosis, 2/2 above, resolved   Hyperkalemia, likely 2/2 ACEi, resolved   Hypomagnesemia, stable     Secondary Diagnoses:  IDDM 2  Hypertension  Hyperlipidemia     Needs on follow up:  PCP and nephrology clinic in 1 week  Consults/Procedures     Consults:   Consults       Date and Time Order Name Status Description    2023  2:20 PM Inpatient Nephrology Consult              Procedures/Scans Performed:  CT abdomen pelvis without contrast         History of Presenting Illness     Chief Complaint   Patient presents with    Abnormal Lab       Patient is a 68 y.o. male who presented to [facility] complaining of abnormal lab.  Please see the admitting history and physical for further details.      Hospital Course     Patient was admitted to [facility] on 23 following presentation to [facility] ED at the direction of his outpatient nephrologist after routine labs revealed multiple abnormalities. He reported a history of hyperkalemia and had recently been switched from losartan to lisinopril due to this. He denied any palpitations or chest pain. Stated PO intake was at baseline. Work up revealed potassium 5.8, creatinine 3.03 with unclear baseline though most recent labs were around 1.4, magnesium 1.3, normal anion gap and CO2 20.4. He had a recent bilateral renal ultrasound on file which was only notable for simple, right renal cyst. He received mag sulfate, normal saline, lactulose and lokelma in the ED. Balanced bicarb infusion was started and he was  admitted to the telemetry unit for further work up and management. Nephrology was consulted and followed.     Lisinopril and other potentially nephrotoxic agents (farxiga, gabapentin) were held on admission. Nephrology agreed hyperkalemia likely 2/2 ACEi vs impaired renal excretion. Lisinopril will remain held at discharge. BP has been difficult to control during admission. Hydralazine initiated and titrated to 75 mg TID and will be continued at discharge in addition to home clonidine. Electrolyte derangements have been corrected as needed per protocol. Creatinine initially improved some with fluid replacement however later again trending up to 2.8-2.9. JANNETH was felt to be most likely due to ATN and given no proteinuria and unremarkable urinary sediment nephrology feels likely plateaued. Given no further intervention planned and patient overall stable, it is felt he can be discharged home with close follow up with his outpatient nephrologist in 1 week with plan for repeat labs at that time as well. He will also be scheduled to follow up with PCP in 1 week. The above discharge plan and medication changes were discussed with the patient and he expressed agreement and understanding. He has been counseled on the importance of blood pressure monitoring/logging, a low potassium diet, and ensuring adequate fluid intake.     Discharge Vitals/Physical Examination     Vital Signs:  Temp:  [97.6 °F (36.4 °C)-98.4 °F (36.9 °C)] 98.1 °F (36.7 °C)  Heart Rate:  [58-74] 70  Resp:  [20-22] 20  BP: (165-209)/() 177/78  Mean Arterial Pressure (Non-Invasive) for the past 24 hrs (Last 3 readings):   Noninvasive MAP (mmHg)   11/10/23 0651 131   11/10/23 0455 126   11/10/23 0327 117     SpO2 Percentage    11/09/23 2315 11/10/23 0327 11/10/23 0651   SpO2: 98% 97% 97%     SpO2:  [96 %-98 %] 97 %  on   ;   Device (Oxygen Therapy): room air    Body mass index is 40.13 kg/m².  Wt Readings from Last 3 Encounters:   11/10/23 127 kg (279 lb  11.2 oz)   06/13/23 118 kg (260 lb)         Physical Exam:  Physical Exam  Vitals and nursing note reviewed.   Constitutional:       General: He is not in acute distress.     Appearance: He is obese.   HENT:      Head: Normocephalic and atraumatic.   Eyes:      Extraocular Movements: Extraocular movements intact.      Conjunctiva/sclera: Conjunctivae normal.   Cardiovascular:      Rate and Rhythm: Normal rate.   Pulmonary:      Effort: Pulmonary effort is normal. No respiratory distress.   Musculoskeletal:      Right lower leg: No edema.      Left lower leg: No edema.   Skin:     General: Skin is warm and dry.   Neurological:      Mental Status: He is alert. Mental status is at baseline.   Psychiatric:         Mood and Affect: Mood normal.         Behavior: Behavior normal.         Pertinent Laboratory/Radiology Results     Pertinent Laboratory Results:  Results from last 7 days   Lab Units 11/07/23  0701   CK TOTAL U/L 83           Results from last 7 days   Lab Units 11/10/23  0216 11/09/23  0045 11/08/23  0119   WBC 10*3/mm3 8.97 8.53 7.14   HEMOGLOBIN g/dL 9.8* 10.3* 10.1*   HEMATOCRIT % 31.6* 31.9* 32.1*   MCV fL 98.8* 97.6* 98.5*   MCHC g/dL 31.0* 32.3 31.5   PLATELETS 10*3/mm3 215 218 198     Results from last 7 days   Lab Units 11/10/23  0216 11/09/23  0045 11/08/23  0119 11/07/23  1203 11/07/23  0701   SODIUM mmol/L 139 144 143   < > 140   POTASSIUM mmol/L 5.0 5.2 5.0   < > 5.8*   MAGNESIUM mg/dL 1.3* 1.4*  --   --  1.3*   CHLORIDE mmol/L 106 110* 113*   < > 110*   CO2 mmol/L 25.6 26.1 20.2*   < > 20.4*   BUN mg/dL 40* 47* 52*   < > 61*   CREATININE mg/dL 2.90* 2.83* 2.54*   < > 3.03*   CALCIUM mg/dL 8.6 8.6 8.4*   < > 9.2   GLUCOSE mg/dL 111* 77 90   < > 82   ALBUMIN g/dL  --   --   --   --  4.2   BILIRUBIN mg/dL  --   --   --   --  0.4   ALK PHOS U/L  --   --   --   --  87   AST (SGOT) U/L  --   --   --   --  15   ALT (SGPT) U/L  --   --   --   --  11    < > = values in this interval not displayed.  "  Estimated Creatinine Clearance: 33.5 mL/min (A) (by C-G formula based on SCr of 2.9 mg/dL (H)).  No results found for: \"AMMONIA\"    Glucose   Date/Time Value Ref Range Status   11/10/2023 0654 106 70 - 130 mg/dL Final   11/09/2023 1923 184 (H) 70 - 130 mg/dL Final   11/09/2023 1700 166 (H) 70 - 130 mg/dL Final   11/09/2023 1122 128 70 - 130 mg/dL Final   11/09/2023 0712 82 70 - 130 mg/dL Final   11/08/2023 1952 155 (H) 70 - 130 mg/dL Final   11/08/2023 1618 136 (H) 70 - 130 mg/dL Final   11/08/2023 1053 104 70 - 130 mg/dL Final     Lab Results   Component Value Date    HGBA1C 7.00 (H) 11/07/2023     No results found for: \"TSH\", \"FREET4\"    No results found for: \"BLOODCX\"  No results found for: \"URINECX\"  No results found for: \"WOUNDCX\"  No results found for: \"STOOLCX\"  No results found for: \"RESPCX\"  Pain Management Panel  More data exists         Latest Ref Rng & Units 9/3/2016 8/30/2015   Pain Management Panel   Creatinine, Urine mg/dL 135.8  104.2        Pertinent Radiology Results:  Imaging Results (All)       Procedure Component Value Units Date/Time    CT Abdomen Pelvis Without Contrast [[id number]] Collected: 11/07/23 0820     Updated: 11/07/23 0822    Narrative:      EXAM:    CT Abdomen and Pelvis Without Intravenous Contrast     EXAM DATE:    11/7/2023 8:51 AM     CLINICAL HISTORY:    JANNETH     TECHNIQUE:    Axial computed tomography images of the abdomen and pelvis without  intravenous contrast.  Sagittal and coronal reformatted images were  created and reviewed.  This CT exam was performed using one or more of  the following dose reduction techniques:  automated exposure control,  adjustment of the mA and/or kV according to patient size, and/or use of  iterative reconstruction technique.     COMPARISON:    6/14/2023     FINDINGS:    LUNG BASES:  Unremarkable as visualized.  No mass.  No consolidation.      ABDOMEN:    LIVER:  Unremarkable as visualized.    GALLBLADDER AND BILE DUCTS:  Unremarkable as " visualized.  No calcified  stones.  No ductal dilation.    PANCREAS:  Unremarkable as visualized.  No ductal dilation.    SPLEEN:  Unremarkable as visualized.  No splenomegaly.    ADRENALS:  Unremarkable as visualized.  No mass.    KIDNEYS AND URETERS:  Unremarkable as visualized.  No obstructing  stones.  No hydronephrosis.    STOMACH AND BOWEL:  Scattered diverticula in the colon.  No  diverticulitis.  No obstruction.      PELVIS:    APPENDIX:  No findings to suggest acute appendicitis.    BLADDER:  Unremarkable as visualized.  No stones.    REPRODUCTIVE:  Unremarkable as visualized.      ABDOMEN and PELVIS:    INTRAPERITONEAL SPACE:  Unremarkable as visualized.  No free air.  No  significant fluid collection.    BONES/JOINTS:  Degenerative disc disease throughout the lumbar spine.   Degenerative facet arthropathy throughout the lumbar spine, most  prominent in the lower lumbar spine.  No acute fracture.  No  dislocation.    SOFT TISSUES:  Unremarkable as visualized.    VASCULATURE:  Atherosclerotic disease.  No abdominal aortic aneurysm.    LYMPH NODES:  Unremarkable as visualized.  No enlarged lymph nodes.       Impression:        Degenerative changes lumbar spine as described.        This report was finalized on 11/7/2023 8:20 AM by Dr. Raul Buchanan MD.               Discharge Disposition/Discharge Medications/Discharge Appointments     Discharge Disposition:   Home or Self Care    Condition at Discharge:  Stable     Discharge Medications:     Your medication list        START taking these medications        Instructions Last Dose Given Next Dose Due   hydrALAZINE 25 MG tablet  Commonly known as: APRESOLINE      Take 3 tablets by mouth 3 times a day.              CONTINUE taking these medications        Instructions Last Dose Given Next Dose Due   atorvastatin 10 MG tablet  Commonly known as: LIPITOR      Take 1 tablet by mouth Daily.       calcitriol 0.25 MCG capsule  Commonly known as: ROCALTROL      Take 1  capsule by mouth 3 (Three) Times a Week.       cloNIDine 0.3 MG/24HR patch  Commonly known as: CATAPRES-TTS      Place 1 patch on the skin as directed by provider 1 (One) Time Per Week.       glimepiride 4 MG tablet  Commonly known as: AMARYL      Take 2 tablets by mouth Every Morning Before Breakfast.       omeprazole 20 MG capsule  Commonly known as: priLOSEC      Take 1 capsule by mouth Daily.       tamsulosin 0.4 MG capsule 24 hr capsule  Commonly known as: FLOMAX      Take 1 capsule by mouth Daily.       Tresiba FlexTouch 100 UNIT/ML solution pen-injector injection  Generic drug: insulin degludec      Inject 10 Units under the skin into the appropriate area as directed Every Night.              STOP taking these medications      Farxiga 10 MG tablet  Generic drug: dapagliflozin Propanediol        gabapentin 400 MG capsule  Commonly known as: NEURONTIN        lisinopril 20 MG tablet  Commonly known as: PRINIVIL,ZESTRIL                  Where to Get Your Medications        These medications were sent to [address] [address]      Hours: Monday to Friday 7 AM to 6 PM Phone: [phone]   hydrALAZINE 25 MG tablet          Discharge Diet:  Diet Instructions    Continue Diabetic / Renal diet as tolerated            Discharge Activity:  Activity Instructions    Advance activity as tolerated              Time spent on this discharge exceeded 30 minutes.      Electronically signed by Toy Catalan MD at 11/10/23 8030

## 2023-11-14 ENCOUNTER — READMISSION MANAGEMENT (OUTPATIENT)
Dept: CALL CENTER | Facility: HOSPITAL | Age: 68
End: 2023-11-14
Payer: COMMERCIAL

## 2023-11-14 NOTE — OUTREACH NOTE
Medical Week 1 Survey      Flowsheet Row Responses   [facility] [place] facility patient discharged from? [facility]   Does the patient have one of the following disease processes/diagnoses(primary or secondary)? Other   Week 1 attempt successful? No   Unsuccessful attempts Attempt 1            Steph Roland Registered Nurse

## 2023-11-17 ENCOUNTER — READMISSION MANAGEMENT (OUTPATIENT)
Dept: CALL CENTER | Facility: HOSPITAL | Age: 68
End: 2023-11-17
Payer: COMMERCIAL

## 2023-11-17 NOTE — OUTREACH NOTE
Medical Week 1 Survey      Flowsheet Row Responses   [facility] patient discharged from? [facility]   Does the patient have one of the following disease processes/diagnoses(primary or secondary)? Other   Week 1 attempt successful? No   Unsuccessful attempts Attempt 2            Delilah EDWARDS - Registered Nurse

## 2023-11-22 ENCOUNTER — READMISSION MANAGEMENT (OUTPATIENT)
Dept: CALL CENTER | Facility: HOSPITAL | Age: 68
End: 2023-11-22
Payer: COMMERCIAL

## 2023-11-22 NOTE — OUTREACH NOTE
Medical Week 1 Survey      Flowsheet Row Responses   [facility] patient discharged from? [facility]   Does the patient have one of the following disease processes/diagnoses(primary or secondary)? Other   Week 1 attempt successful? No   Unsuccessful attempts Attempt 3   Revoke Decline to participate            Edith PEREIRA - Licensed Nurse

## 2024-04-04 ENCOUNTER — APPOINTMENT (OUTPATIENT)
Dept: CT IMAGING | Facility: HOSPITAL | Age: 69
End: 2024-04-04
Payer: MEDICARE

## 2024-04-04 ENCOUNTER — HOSPITAL ENCOUNTER (EMERGENCY)
Facility: HOSPITAL | Age: 69
Discharge: HOME | End: 2024-04-04
Attending: STUDENT IN AN ORGANIZED HEALTH CARE EDUCATION/TRAINING PROGRAM
Payer: MEDICARE

## 2024-04-04 VITALS
DIASTOLIC BLOOD PRESSURE: 90 MMHG | OXYGEN SATURATION: 95 % | RESPIRATION RATE: 16 BRPM | WEIGHT: 263 LBS | HEIGHT: 70 IN | BODY MASS INDEX: 37.65 KG/M2 | SYSTOLIC BLOOD PRESSURE: 197 MMHG | TEMPERATURE: 98.4 F | HEART RATE: 94 BPM

## 2024-04-04 DIAGNOSIS — L03.115 CELLULITIS OF RIGHT FOOT: Primary | ICD-10-CM

## 2024-04-04 LAB
ALBUMIN SERPL-MCNC: 4.2 G/DL (ref 3.5–5.2)
ALBUMIN/GLOB SERPL: 1.3 G/DL
ALP SERPL-CCNC: 105 U/L (ref 39–117)
ALT SERPL W P-5'-P-CCNC: 14 U/L (ref 1–41)
ANION GAP SERPL CALCULATED.3IONS-SCNC: 15.9 MMOL/L (ref 5–15)
AST SERPL-CCNC: 20 U/L (ref 1–40)
BASOPHILS # BLD AUTO: 0.07 10*3/MM3 (ref 0–0.2)
BASOPHILS NFR BLD AUTO: 0.6 % (ref 0–1.5)
BILIRUB SERPL-MCNC: 0.4 MG/DL (ref 0–1.2)
BUN SERPL-MCNC: 50 MG/DL (ref 8–23)
BUN/CREAT SERPL: 24.9 (ref 7–25)
CALCIUM SPEC-SCNC: 9.7 MG/DL (ref 8.6–10.5)
CHLORIDE SERPL-SCNC: 103 MMOL/L (ref 98–107)
CO2 SERPL-SCNC: 19.1 MMOL/L (ref 22–29)
CREAT SERPL-MCNC: 2.01 MG/DL (ref 0.76–1.27)
CRP SERPL-MCNC: 2.1 MG/DL (ref 0–0.5)
DEPRECATED RDW RBC AUTO: 44.2 FL (ref 37–54)
EGFRCR SERPLBLD CKD-EPI 2021: 35.9 ML/MIN/1.73
EOSINOPHIL # BLD AUTO: 0.16 10*3/MM3 (ref 0–0.4)
EOSINOPHIL NFR BLD AUTO: 1.3 % (ref 0.3–6.2)
ERYTHROCYTE [DISTWIDTH] IN BLOOD BY AUTOMATED COUNT: 12.1 % (ref 12.3–15.4)
GLOBULIN UR ELPH-MCNC: 3.2 GM/DL
GLUCOSE SERPL-MCNC: 169 MG/DL (ref 65–99)
HCT VFR BLD AUTO: 40 % (ref 37.5–51)
HGB BLD-MCNC: 13.1 G/DL (ref 13–17.7)
HOLD SPECIMEN: NORMAL
IMM GRANULOCYTES # BLD AUTO: 0.08 10*3/MM3 (ref 0–0.05)
IMM GRANULOCYTES NFR BLD AUTO: 0.6 % (ref 0–0.5)
LYMPHOCYTES # BLD AUTO: 1.7 10*3/MM3 (ref 0.7–3.1)
LYMPHOCYTES NFR BLD AUTO: 13.8 % (ref 19.6–45.3)
MCH RBC QN AUTO: 32.1 PG (ref 26.6–33)
MCHC RBC AUTO-ENTMCNC: 32.8 G/DL (ref 31.5–35.7)
MCV RBC AUTO: 98 FL (ref 79–97)
MONOCYTES # BLD AUTO: 0.9 10*3/MM3 (ref 0.1–0.9)
MONOCYTES NFR BLD AUTO: 7.3 % (ref 5–12)
NEUTROPHILS NFR BLD AUTO: 76.4 % (ref 42.7–76)
NEUTROPHILS NFR BLD AUTO: 9.45 10*3/MM3 (ref 1.7–7)
NRBC BLD AUTO-RTO: 0 /100 WBC (ref 0–0.2)
PLATELET # BLD AUTO: 325 10*3/MM3 (ref 140–450)
PMV BLD AUTO: 10 FL (ref 6–12)
POTASSIUM SERPL-SCNC: 4 MMOL/L (ref 3.5–5.2)
PROT SERPL-MCNC: 7.4 G/DL (ref 6–8.5)
RBC # BLD AUTO: 4.08 10*6/MM3 (ref 4.14–5.8)
SODIUM SERPL-SCNC: 138 MMOL/L (ref 136–145)
WBC NRBC COR # BLD AUTO: 12.36 10*3/MM3 (ref 3.4–10.8)
WHOLE BLOOD HOLD COAG: NORMAL
WHOLE BLOOD HOLD SPECIMEN: NORMAL

## 2024-04-04 PROCEDURE — 73700 CT LOWER EXTREMITY W/O DYE: CPT

## 2024-04-04 PROCEDURE — 85025 COMPLETE CBC W/AUTO DIFF WBC: CPT | Performed by: NURSE PRACTITIONER

## 2024-04-04 PROCEDURE — 96365 THER/PROPH/DIAG IV INF INIT: CPT

## 2024-04-04 PROCEDURE — 25010000002 DALBAVANCIN 500 MG RECONSTITUTED SOLUTION: Performed by: NURSE PRACTITIONER

## 2024-04-04 PROCEDURE — 80053 COMPREHEN METABOLIC PANEL: CPT | Performed by: NURSE PRACTITIONER

## 2024-04-04 PROCEDURE — 86140 C-REACTIVE PROTEIN: CPT | Performed by: NURSE PRACTITIONER

## 2024-04-04 PROCEDURE — 0 DEXTROSE 5 % SOLUTION: Performed by: NURSE PRACTITIONER

## 2024-04-04 PROCEDURE — 99284 EMERGENCY DEPT VISIT MOD MDM: CPT

## 2024-04-04 RX ORDER — DEXTROSE MONOHYDRATE 50 MG/ML
25 INJECTION, SOLUTION INTRAVENOUS ONCE
Status: COMPLETED | OUTPATIENT
Start: 2024-04-04 | End: 2024-04-04

## 2024-04-04 RX ORDER — SODIUM CHLORIDE 0.9 % (FLUSH) 0.9 %
10 SYRINGE (ML) INJECTION AS NEEDED
Status: DISCONTINUED | OUTPATIENT
Start: 2024-04-04 | End: 2024-04-04 | Stop reason: HOSPADM

## 2024-04-04 RX ADMIN — DEXTROSE MONOHYDRATE 25 ML: 50 INJECTION, SOLUTION INTRAVENOUS at 17:56

## 2024-04-04 RX ADMIN — DALBAVANCIN 1500 MG: 500 INJECTION, POWDER, FOR SOLUTION INTRAVENOUS at 17:57

## 2024-04-04 NOTE — DISCHARGE INSTRUCTIONS
Follow up with your primary care provider.     Return to the emergency room for worsening symptoms

## 2024-04-04 NOTE — ED PROVIDER NOTES
Subjective   History of Present Illness  Patient is a 65 male presents to complaining of redness swelling to right foot.  Patient was seen by podiatry recommended he come to the ER for imaging and IV antibiotics.  Patient reports he is diabetic.  Patient reports no prior history of cellulitis.  Patient reports that he noticed redness and swelling this morning.  Patient denies any known injury.        Review of Systems   Constitutional: Negative.    HENT: Negative.     Eyes: Negative.    Respiratory: Negative.     Cardiovascular: Negative.    Gastrointestinal: Negative.    Endocrine: Negative.    Genitourinary: Negative.    Musculoskeletal: Negative.    Skin: Negative.    Allergic/Immunologic: Negative.    Neurological: Negative.    Hematological: Negative.    Psychiatric/Behavioral: Negative.         No past medical history on file.    Allergies   Allergen Reactions    Sulfa Antibiotics Hives and Itching     Causes redness all over and itching       No past surgical history on file.    No family history on file.    Social History     Socioeconomic History    Marital status: Single   Tobacco Use    Smoking status: Never    Smokeless tobacco: Never   Vaping Use    Vaping status: Never Used   Substance and Sexual Activity    Alcohol use: Never    Drug use: Never           Objective   Physical Exam  Vitals and nursing note reviewed.   Constitutional:       Appearance: He is well-developed.   HENT:      Head: Normocephalic.      Right Ear: External ear normal.      Left Ear: External ear normal.   Eyes:      Conjunctiva/sclera: Conjunctivae normal.      Pupils: Pupils are equal, round, and reactive to light.   Cardiovascular:      Rate and Rhythm: Normal rate and regular rhythm.      Heart sounds: Normal heart sounds.   Pulmonary:      Effort: Pulmonary effort is normal.      Breath sounds: Normal breath sounds.   Abdominal:      General: Bowel sounds are normal.      Palpations: Abdomen is soft.   Musculoskeletal:          General: Normal range of motion.      Cervical back: Normal range of motion and neck supple.   Skin:     General: Skin is warm and dry.      Capillary Refill: Capillary refill takes less than 2 seconds.      Findings: Erythema (Right miedial foot) present.   Neurological:      Mental Status: He is alert and oriented to person, place, and time.   Psychiatric:         Behavior: Behavior normal.         Thought Content: Thought content normal.         Procedures       Results for orders placed or performed during the hospital encounter of 04/04/24   Comprehensive Metabolic Panel    Specimen: Blood   Result Value Ref Range    Glucose 169 (H) 65 - 99 mg/dL    BUN 50 (H) 8 - 23 mg/dL    Creatinine 2.01 (H) 0.76 - 1.27 mg/dL    Sodium 138 136 - 145 mmol/L    Potassium 4.0 3.5 - 5.2 mmol/L    Chloride 103 98 - 107 mmol/L    CO2 19.1 (L) 22.0 - 29.0 mmol/L    Calcium 9.7 8.6 - 10.5 mg/dL    Total Protein 7.4 6.0 - 8.5 g/dL    Albumin 4.2 3.5 - 5.2 g/dL    ALT (SGPT) 14 1 - 41 U/L    AST (SGOT) 20 1 - 40 U/L    Alkaline Phosphatase 105 39 - 117 U/L    Total Bilirubin 0.4 0.0 - 1.2 mg/dL    Globulin 3.2 gm/dL    A/G Ratio 1.3 g/dL    BUN/Creatinine Ratio 24.9 7.0 - 25.0    Anion Gap 15.9 (H) 5.0 - 15.0 mmol/L    eGFR 35.9 (L) >60.0 mL/min/1.73   C-reactive Protein    Specimen: Blood   Result Value Ref Range    C-Reactive Protein 2.10 (H) 0.00 - 0.50 mg/dL   CBC Auto Differential    Specimen: Blood   Result Value Ref Range    WBC 12.36 (H) 3.40 - 10.80 10*3/mm3    RBC 4.08 (L) 4.14 - 5.80 10*6/mm3    Hemoglobin 13.1 13.0 - 17.7 g/dL    Hematocrit 40.0 37.5 - 51.0 %    MCV 98.0 (H) 79.0 - 97.0 fL    MCH 32.1 26.6 - 33.0 pg    MCHC 32.8 31.5 - 35.7 g/dL    RDW 12.1 (L) 12.3 - 15.4 %    RDW-SD 44.2 37.0 - 54.0 fl    MPV 10.0 6.0 - 12.0 fL    Platelets 325 140 - 450 10*3/mm3    Neutrophil % 76.4 (H) 42.7 - 76.0 %    Lymphocyte % 13.8 (L) 19.6 - 45.3 %    Monocyte % 7.3 5.0 - 12.0 %    Eosinophil % 1.3 0.3 - 6.2 %    Basophil %  0.6 0.0 - 1.5 %    Immature Grans % 0.6 (H) 0.0 - 0.5 %    Neutrophils, Absolute 9.45 (H) 1.70 - 7.00 10*3/mm3    Lymphocytes, Absolute 1.70 0.70 - 3.10 10*3/mm3    Monocytes, Absolute 0.90 0.10 - 0.90 10*3/mm3    Eosinophils, Absolute 0.16 0.00 - 0.40 10*3/mm3    Basophils, Absolute 0.07 0.00 - 0.20 10*3/mm3    Immature Grans, Absolute 0.08 (H) 0.00 - 0.05 10*3/mm3    nRBC 0.0 0.0 - 0.2 /100 WBC   Green Top (Gel)   Result Value Ref Range    Extra Tube Hold for add-ons.    Lavender Top   Result Value Ref Range    Extra Tube hold for add-on    Gold Top - SST   Result Value Ref Range    Extra Tube Hold for add-ons.    Gray Top   Result Value Ref Range    Extra Tube Hold for add-ons.    Light Blue Top   Result Value Ref Range    Extra Tube Hold for add-ons.      CT Lower Extremity Right Without Contrast   Final Result   Impression:       No acute bony process.               This report was finalized on 4/4/2024 5:01 PM by Leah Junior MD.                 ED Course                                             Medical Decision Making  Patient is a 65 male presents to complaining of redness swelling to right foot.  Patient was seen by podiatry recommended he come to the ER for imaging and IV antibiotics.  Patient reports he is diabetic.  Patient reports no prior history of cellulitis.  Patient reports that he noticed redness and swelling this morning.  Patient denies any known injury.      Problems Addressed:  Cellulitis of right foot: complicated acute illness or injury    Amount and/or Complexity of Data Reviewed  Labs: ordered. Decision-making details documented in ED Course.  Radiology: ordered. Decision-making details documented in ED Course.    Risk  Prescription drug management.        Final diagnoses:   Cellulitis of right foot       ED Disposition  ED Disposition       ED Disposition   Discharge    Condition   Stable    Comment   --               Mendoza Sharma MD  [address] [place] [address]  [id number]  [phone]    Schedule an appointment as soon as possible for a visit   For further evaluation         Medication List      No changes were made to your prescriptions during this visit.            Dennis Azul, NIKI  04/07/24 8350       Dennis Azul, NIKI  04/07/24 5309

## 2024-09-13 ENCOUNTER — TRANSCRIBE ORDERS (OUTPATIENT)
Dept: ADMINISTRATIVE | Facility: HOSPITAL | Age: 69
End: 2024-09-13
Payer: COMMERCIAL

## 2024-09-13 DIAGNOSIS — N28.1 ACQUIRED CYST OF KIDNEY: Primary | ICD-10-CM

## 2024-10-15 ENCOUNTER — HOSPITAL ENCOUNTER (OUTPATIENT)
Facility: HOSPITAL | Age: 69
Discharge: HOME | End: 2024-10-15
Admitting: INTERNAL MEDICINE
Payer: MEDICARE

## 2024-10-15 DIAGNOSIS — N28.1 ACQUIRED CYST OF KIDNEY: ICD-10-CM

## 2024-10-15 PROCEDURE — 76775 US EXAM ABDO BACK WALL LIM: CPT
